# Patient Record
Sex: FEMALE | Race: WHITE | Employment: OTHER | URBAN - NONMETROPOLITAN AREA
[De-identification: names, ages, dates, MRNs, and addresses within clinical notes are randomized per-mention and may not be internally consistent; named-entity substitution may affect disease eponyms.]

---

## 2017-01-26 DIAGNOSIS — E28.2 PCOS (POLYCYSTIC OVARIAN SYNDROME): ICD-10-CM

## 2017-02-14 ENCOUNTER — HOSPITAL ENCOUNTER (OUTPATIENT)
Age: 37
Setting detail: SPECIMEN
Discharge: HOME | End: 2017-02-14
Payer: COMMERCIAL

## 2017-02-14 ENCOUNTER — OFFICE VISIT (OUTPATIENT)
Dept: PRIMARY CARE CLINIC | Age: 37
End: 2017-02-14
Payer: COMMERCIAL

## 2017-02-14 VITALS
OXYGEN SATURATION: 98 % | SYSTOLIC BLOOD PRESSURE: 110 MMHG | DIASTOLIC BLOOD PRESSURE: 80 MMHG | WEIGHT: 156.75 LBS | HEIGHT: 66 IN | HEART RATE: 99 BPM | BODY MASS INDEX: 25.19 KG/M2 | TEMPERATURE: 97.7 F

## 2017-02-14 DIAGNOSIS — F41.9 ANXIETY: ICD-10-CM

## 2017-02-14 DIAGNOSIS — F41.0 PANIC ATTACKS: ICD-10-CM

## 2017-02-14 DIAGNOSIS — R21 RASH: ICD-10-CM

## 2017-02-14 DIAGNOSIS — E28.2 PCOS (POLYCYSTIC OVARIAN SYNDROME): ICD-10-CM

## 2017-02-14 DIAGNOSIS — Z12.4 ROUTINE PAPANICOLAOU SMEAR: Primary | ICD-10-CM

## 2017-02-14 DIAGNOSIS — Z87.42 HX OF OVARIAN CYST: ICD-10-CM

## 2017-02-14 DIAGNOSIS — H53.9 VISUAL CHANGES: ICD-10-CM

## 2017-02-14 PROCEDURE — 99214 OFFICE O/P EST MOD 30 MIN: CPT | Performed by: NURSE PRACTITIONER

## 2017-02-14 PROCEDURE — 88142 CYTOPATH C/V THIN LAYER: CPT

## 2017-02-14 RX ORDER — CLOTRIMAZOLE 1 %
CREAM (GRAM) TOPICAL
Qty: 24 G | Refills: 1 | Status: SHIPPED | OUTPATIENT
Start: 2017-02-14 | End: 2017-03-31 | Stop reason: SDUPTHER

## 2017-02-14 RX ORDER — DIAZEPAM 10 MG/1
10 TABLET ORAL EVERY 12 HOURS PRN
Qty: 60 TABLET | Refills: 0 | Status: SHIPPED | OUTPATIENT
Start: 2017-02-14 | End: 2017-02-24

## 2017-02-14 RX ORDER — LORAZEPAM 1 MG/1
1 TABLET ORAL EVERY 8 HOURS PRN
Qty: 90 TABLET | Refills: 0 | Status: CANCELLED | OUTPATIENT
Start: 2017-02-14

## 2017-02-14 ASSESSMENT — ENCOUNTER SYMPTOMS
VOICE CHANGE: 1
CONSTIPATION: 0
RHINORRHEA: 0
SHORTNESS OF BREATH: 0
SORE THROAT: 0
EYE REDNESS: 0
COUGH: 0
DIARRHEA: 0
VOMITING: 0

## 2017-02-21 ENCOUNTER — TELEPHONE (OUTPATIENT)
Dept: PRIMARY CARE CLINIC | Age: 37
End: 2017-02-21

## 2017-02-27 ENCOUNTER — TELEPHONE (OUTPATIENT)
Dept: PRIMARY CARE CLINIC | Age: 37
End: 2017-02-27

## 2017-02-27 DIAGNOSIS — Z00.00 ROUTINE GENERAL MEDICAL EXAMINATION AT A HEALTH CARE FACILITY: Primary | ICD-10-CM

## 2017-02-28 ENCOUNTER — OFFICE VISIT (OUTPATIENT)
Dept: PRIMARY CARE CLINIC | Age: 37
End: 2017-02-28
Payer: COMMERCIAL

## 2017-02-28 VITALS
DIASTOLIC BLOOD PRESSURE: 70 MMHG | HEIGHT: 66 IN | HEART RATE: 80 BPM | OXYGEN SATURATION: 99 % | WEIGHT: 156.5 LBS | TEMPERATURE: 98.6 F | BODY MASS INDEX: 25.15 KG/M2 | SYSTOLIC BLOOD PRESSURE: 110 MMHG

## 2017-02-28 DIAGNOSIS — M54.42 CHRONIC BILATERAL LOW BACK PAIN WITH BILATERAL SCIATICA: Primary | ICD-10-CM

## 2017-02-28 DIAGNOSIS — G89.29 CHRONIC BILATERAL THORACIC BACK PAIN: ICD-10-CM

## 2017-02-28 DIAGNOSIS — M54.6 CHRONIC BILATERAL THORACIC BACK PAIN: ICD-10-CM

## 2017-02-28 DIAGNOSIS — G62.9 NEUROPATHY: ICD-10-CM

## 2017-02-28 DIAGNOSIS — M54.41 CHRONIC BILATERAL LOW BACK PAIN WITH BILATERAL SCIATICA: Primary | ICD-10-CM

## 2017-02-28 DIAGNOSIS — M54.2 CERVICAL PAIN (NECK): ICD-10-CM

## 2017-02-28 DIAGNOSIS — G89.29 CHRONIC BILATERAL LOW BACK PAIN WITH BILATERAL SCIATICA: Primary | ICD-10-CM

## 2017-02-28 DIAGNOSIS — Z13.1 DIABETES MELLITUS SCREENING: ICD-10-CM

## 2017-02-28 PROCEDURE — 99213 OFFICE O/P EST LOW 20 MIN: CPT | Performed by: NURSE PRACTITIONER

## 2017-02-28 RX ORDER — PREDNISONE 10 MG/1
10 TABLET ORAL DAILY
Qty: 42 TABLET | Refills: 0 | Status: SHIPPED | OUTPATIENT
Start: 2017-02-28 | End: 2017-03-10

## 2017-02-28 RX ORDER — DIAZEPAM 10 MG/1
10 TABLET ORAL EVERY 6 HOURS PRN
COMMUNITY
End: 2017-03-31 | Stop reason: SDUPTHER

## 2017-02-28 RX ORDER — HYDROCODONE BITARTRATE AND ACETAMINOPHEN 7.5; 325 MG/1; MG/1
1 TABLET ORAL EVERY 6 HOURS PRN
Qty: 30 TABLET | Refills: 0 | Status: SHIPPED | OUTPATIENT
Start: 2017-02-28 | End: 2017-03-07

## 2017-02-28 RX ORDER — TIZANIDINE 4 MG/1
4 TABLET ORAL 3 TIMES DAILY
Qty: 60 TABLET | Refills: 1 | Status: SHIPPED | OUTPATIENT
Start: 2017-02-28 | End: 2017-06-27

## 2017-02-28 ASSESSMENT — ENCOUNTER SYMPTOMS
EYE REDNESS: 0
VOICE CHANGE: 1
RHINORRHEA: 0
CONSTIPATION: 0
SORE THROAT: 0
SHORTNESS OF BREATH: 0
VOMITING: 0
DIARRHEA: 0
BACK PAIN: 1
COUGH: 0

## 2017-03-01 DIAGNOSIS — F41.9 ANXIETY: ICD-10-CM

## 2017-03-01 RX ORDER — DULOXETIN HYDROCHLORIDE 30 MG/1
30 CAPSULE, DELAYED RELEASE ORAL DAILY
Qty: 30 CAPSULE | Refills: 3 | Status: SHIPPED | OUTPATIENT
Start: 2017-03-01 | End: 2017-10-06 | Stop reason: SDUPTHER

## 2017-03-06 ENCOUNTER — TELEPHONE (OUTPATIENT)
Dept: PRIMARY CARE CLINIC | Age: 37
End: 2017-03-06

## 2017-03-06 DIAGNOSIS — M54.5 LOW BACK PAIN, UNSPECIFIED BACK PAIN LATERALITY, UNSPECIFIED CHRONICITY, WITH SCIATICA PRESENCE UNSPECIFIED: ICD-10-CM

## 2017-03-06 DIAGNOSIS — M54.6 THORACIC BACK PAIN, UNSPECIFIED BACK PAIN LATERALITY, UNSPECIFIED CHRONICITY: Primary | ICD-10-CM

## 2017-03-07 RX ORDER — MELOXICAM 15 MG/1
15 TABLET ORAL DAILY
Qty: 30 TABLET | Refills: 3 | Status: SHIPPED | OUTPATIENT
Start: 2017-03-07 | End: 2017-06-27

## 2017-03-29 ENCOUNTER — HOSPITAL ENCOUNTER (OUTPATIENT)
Dept: GENERAL RADIOLOGY | Age: 37
Discharge: HOME | End: 2017-03-29
Payer: COMMERCIAL

## 2017-03-29 DIAGNOSIS — Z87.42 HX OF OVARIAN CYST: ICD-10-CM

## 2017-03-29 PROCEDURE — 76856 US EXAM PELVIC COMPLETE: CPT

## 2017-03-30 ENCOUNTER — TELEPHONE (OUTPATIENT)
Dept: PRIMARY CARE CLINIC | Age: 37
End: 2017-03-30

## 2017-03-31 ENCOUNTER — OFFICE VISIT (OUTPATIENT)
Dept: PRIMARY CARE CLINIC | Age: 37
End: 2017-03-31
Payer: COMMERCIAL

## 2017-03-31 VITALS
HEIGHT: 66 IN | WEIGHT: 152.25 LBS | HEART RATE: 92 BPM | OXYGEN SATURATION: 98 % | BODY MASS INDEX: 24.47 KG/M2 | TEMPERATURE: 98.5 F | DIASTOLIC BLOOD PRESSURE: 80 MMHG | SYSTOLIC BLOOD PRESSURE: 130 MMHG

## 2017-03-31 DIAGNOSIS — F41.9 ANXIETY: Primary | ICD-10-CM

## 2017-03-31 DIAGNOSIS — J34.89 NASAL VESTIBULITIS: ICD-10-CM

## 2017-03-31 DIAGNOSIS — R21 RASH: ICD-10-CM

## 2017-03-31 DIAGNOSIS — R45.4 ANGER: ICD-10-CM

## 2017-03-31 DIAGNOSIS — G89.29 CHRONIC BILATERAL LOW BACK PAIN WITHOUT SCIATICA: ICD-10-CM

## 2017-03-31 DIAGNOSIS — M54.50 CHRONIC BILATERAL LOW BACK PAIN WITHOUT SCIATICA: ICD-10-CM

## 2017-03-31 DIAGNOSIS — Z00.00 ROUTINE GENERAL MEDICAL EXAMINATION AT A HEALTH CARE FACILITY: ICD-10-CM

## 2017-03-31 DIAGNOSIS — F90.2 ATTENTION DEFICIT HYPERACTIVITY DISORDER (ADHD), COMBINED TYPE: ICD-10-CM

## 2017-03-31 DIAGNOSIS — Z13.1 DIABETES MELLITUS SCREENING: ICD-10-CM

## 2017-03-31 LAB
ALBUMIN SERPL-MCNC: 4.5 G/DL (ref 3.5–5.2)
ALP BLD-CCNC: 46 U/L (ref 35–104)
ALT SERPL-CCNC: 19 U/L (ref 5–33)
ANION GAP SERPL CALCULATED.3IONS-SCNC: 17 MMOL/L (ref 7–19)
AST SERPL-CCNC: 25 U/L (ref 5–32)
BASOPHILS ABSOLUTE: 0 K/UL (ref 0–0.2)
BASOPHILS RELATIVE PERCENT: 0.2 % (ref 0–1)
BILIRUB SERPL-MCNC: 0.3 MG/DL (ref 0.2–1.2)
BUN BLDV-MCNC: 16 MG/DL (ref 6–20)
CALCIUM SERPL-MCNC: 9.6 MG/DL (ref 8.6–10)
CHLORIDE BLD-SCNC: 101 MMOL/L (ref 98–111)
CHOLESTEROL, TOTAL: 207 MG/DL (ref 160–199)
CO2: 25 MMOL/L (ref 22–29)
CREAT SERPL-MCNC: 0.8 MG/DL (ref 0.5–0.9)
EOSINOPHILS ABSOLUTE: 0.1 K/UL (ref 0–0.6)
EOSINOPHILS RELATIVE PERCENT: 1.3 % (ref 0–5)
GFR NON-AFRICAN AMERICAN: >60
GLOBULIN: 3.1 G/DL
GLUCOSE BLD-MCNC: 87 MG/DL (ref 74–109)
HBA1C MFR BLD: 5 %
HCT VFR BLD CALC: 39.8 % (ref 37–47)
HDLC SERPL-MCNC: 55 MG/DL (ref 65–121)
HEMOGLOBIN: 12.3 G/DL (ref 12–16)
LDL CHOLESTEROL CALCULATED: 125 MG/DL
LYMPHOCYTES ABSOLUTE: 1.3 K/UL (ref 1.1–4.5)
LYMPHOCYTES RELATIVE PERCENT: 25.1 % (ref 20–40)
MCH RBC QN AUTO: 28.7 PG (ref 27–31)
MCHC RBC AUTO-ENTMCNC: 30.9 G/DL (ref 33–37)
MCV RBC AUTO: 92.8 FL (ref 81–99)
MONOCYTES ABSOLUTE: 0.6 K/UL (ref 0–0.9)
MONOCYTES RELATIVE PERCENT: 11.3 % (ref 0–10)
NEUTROPHILS ABSOLUTE: 3.3 K/UL (ref 1.5–7.5)
NEUTROPHILS RELATIVE PERCENT: 62.1 % (ref 50–65)
PDW BLD-RTO: 13.3 % (ref 11.5–14.5)
PLATELET # BLD: 392 K/UL (ref 130–400)
PMV BLD AUTO: 10.4 FL (ref 7.4–10.4)
POTASSIUM SERPL-SCNC: 4.1 MMOL/L (ref 3.5–5)
RBC # BLD: 4.29 M/UL (ref 4.2–5.4)
SODIUM BLD-SCNC: 143 MMOL/L (ref 136–145)
T4 FREE: 1 NG/ML (ref 0.9–1.7)
TOTAL PROTEIN: 7.6 G/DL (ref 6.6–8.7)
TRIGL SERPL-MCNC: 135 MG/DL (ref 150–199)
TSH SERPL DL<=0.05 MIU/L-ACNC: 0.51 UIU/ML (ref 0.27–4.2)
WBC # BLD: 5.3 K/UL (ref 4.8–10.8)

## 2017-03-31 PROCEDURE — 99214 OFFICE O/P EST MOD 30 MIN: CPT | Performed by: NURSE PRACTITIONER

## 2017-03-31 RX ORDER — DEXTROAMPHETAMINE SACCHARATE, AMPHETAMINE ASPARTATE, DEXTROAMPHETAMINE SULFATE AND AMPHETAMINE SULFATE 2.5; 2.5; 2.5; 2.5 MG/1; MG/1; MG/1; MG/1
TABLET ORAL
COMMUNITY
Start: 2017-03-28

## 2017-03-31 RX ORDER — DIAZEPAM 10 MG/1
10 TABLET ORAL EVERY 8 HOURS PRN
Qty: 90 TABLET | Refills: 0 | Status: SHIPPED | OUTPATIENT
Start: 2017-03-31 | End: 2017-05-10 | Stop reason: SDUPTHER

## 2017-03-31 RX ORDER — DEXTROAMPHETAMINE SACCHARATE, AMPHETAMINE ASPARTATE MONOHYDRATE, DEXTROAMPHETAMINE SULFATE AND AMPHETAMINE SULFATE 7.5; 7.5; 7.5; 7.5 MG/1; MG/1; MG/1; MG/1
CAPSULE, EXTENDED RELEASE ORAL
COMMUNITY
Start: 2017-03-28

## 2017-03-31 RX ORDER — CLOTRIMAZOLE 1 %
CREAM (GRAM) TOPICAL
Qty: 60 G | Refills: 1 | Status: SHIPPED | OUTPATIENT
Start: 2017-03-31 | End: 2017-04-07

## 2017-03-31 ASSESSMENT — ENCOUNTER SYMPTOMS
CONSTIPATION: 0
BACK PAIN: 1
VOMITING: 0
RHINORRHEA: 0
EYE REDNESS: 0
DIARRHEA: 0
SHORTNESS OF BREATH: 0
VOICE CHANGE: 1
COUGH: 0
SORE THROAT: 0

## 2017-04-04 ENCOUNTER — TELEPHONE (OUTPATIENT)
Dept: PRIMARY CARE CLINIC | Age: 37
End: 2017-04-04

## 2017-04-04 DIAGNOSIS — Z79.899 MEDICATION MANAGEMENT: ICD-10-CM

## 2017-04-04 DIAGNOSIS — E78.2 MIXED HYPERLIPIDEMIA: Primary | ICD-10-CM

## 2017-04-04 RX ORDER — SIMVASTATIN 20 MG
20 TABLET ORAL EVERY EVENING
Qty: 30 TABLET | Refills: 11 | Status: SHIPPED | OUTPATIENT
Start: 2017-04-04 | End: 2018-04-08 | Stop reason: SDUPTHER

## 2017-04-10 RX ORDER — GABAPENTIN 400 MG/1
400 CAPSULE ORAL 3 TIMES DAILY
Qty: 90 CAPSULE | Refills: 1 | Status: SHIPPED | OUTPATIENT
Start: 2017-04-10 | End: 2017-07-18 | Stop reason: SDUPTHER

## 2017-05-08 ENCOUNTER — TELEPHONE (OUTPATIENT)
Dept: PRIMARY CARE CLINIC | Age: 37
End: 2017-05-08

## 2017-05-08 DIAGNOSIS — F41.9 ANXIETY: ICD-10-CM

## 2017-05-08 RX ORDER — DIAZEPAM 10 MG/1
10 TABLET ORAL EVERY 8 HOURS PRN
Qty: 90 TABLET | Refills: 0 | Status: CANCELLED | OUTPATIENT
Start: 2017-05-08

## 2017-05-10 ENCOUNTER — OFFICE VISIT (OUTPATIENT)
Dept: PRIMARY CARE CLINIC | Age: 37
End: 2017-05-10
Payer: COMMERCIAL

## 2017-05-10 VITALS
SYSTOLIC BLOOD PRESSURE: 120 MMHG | HEART RATE: 77 BPM | TEMPERATURE: 98.2 F | BODY MASS INDEX: 25.59 KG/M2 | OXYGEN SATURATION: 98 % | DIASTOLIC BLOOD PRESSURE: 80 MMHG | HEIGHT: 66 IN | WEIGHT: 159.25 LBS

## 2017-05-10 DIAGNOSIS — G89.29 CHRONIC BILATERAL LOW BACK PAIN WITHOUT SCIATICA: ICD-10-CM

## 2017-05-10 DIAGNOSIS — M54.50 CHRONIC BILATERAL LOW BACK PAIN WITHOUT SCIATICA: ICD-10-CM

## 2017-05-10 DIAGNOSIS — F41.9 ANXIETY: Primary | ICD-10-CM

## 2017-05-10 PROCEDURE — 99213 OFFICE O/P EST LOW 20 MIN: CPT | Performed by: NURSE PRACTITIONER

## 2017-05-10 RX ORDER — DIAZEPAM 10 MG/1
10 TABLET ORAL EVERY 8 HOURS PRN
Qty: 90 TABLET | Refills: 0 | Status: SHIPPED | OUTPATIENT
Start: 2017-05-10 | End: 2017-07-26 | Stop reason: ALTCHOICE

## 2017-05-10 ASSESSMENT — ENCOUNTER SYMPTOMS
CONSTIPATION: 0
SORE THROAT: 0
COUGH: 0
BACK PAIN: 1
RHINORRHEA: 0
DIARRHEA: 0
EYE REDNESS: 0
VOMITING: 0
VOICE CHANGE: 1
SHORTNESS OF BREATH: 0

## 2017-05-15 ENCOUNTER — TELEPHONE (OUTPATIENT)
Dept: PRIMARY CARE CLINIC | Age: 37
End: 2017-05-15

## 2017-05-15 DIAGNOSIS — G89.29 CHRONIC BILATERAL LOW BACK PAIN WITHOUT SCIATICA: Primary | ICD-10-CM

## 2017-05-15 DIAGNOSIS — M54.50 CHRONIC BILATERAL LOW BACK PAIN WITHOUT SCIATICA: Primary | ICD-10-CM

## 2017-05-22 ENCOUNTER — TELEPHONE (OUTPATIENT)
Dept: PRIMARY CARE CLINIC | Age: 37
End: 2017-05-22

## 2017-05-22 ENCOUNTER — HOSPITAL ENCOUNTER (OUTPATIENT)
Dept: MRI IMAGING | Age: 37
Discharge: HOME | End: 2017-05-22
Payer: COMMERCIAL

## 2017-05-22 DIAGNOSIS — M54.50 CHRONIC BILATERAL LOW BACK PAIN WITHOUT SCIATICA: ICD-10-CM

## 2017-05-22 DIAGNOSIS — G89.29 CHRONIC BILATERAL LOW BACK PAIN WITHOUT SCIATICA: ICD-10-CM

## 2017-05-22 PROCEDURE — 72148 MRI LUMBAR SPINE W/O DYE: CPT

## 2017-05-24 ENCOUNTER — TELEPHONE (OUTPATIENT)
Dept: PRIMARY CARE CLINIC | Age: 37
End: 2017-05-24

## 2017-05-24 DIAGNOSIS — G89.29 CHRONIC BILATERAL LOW BACK PAIN, WITH SCIATICA PRESENCE UNSPECIFIED: ICD-10-CM

## 2017-05-24 DIAGNOSIS — M54.6 THORACIC BACK PAIN, UNSPECIFIED BACK PAIN LATERALITY, UNSPECIFIED CHRONICITY: Primary | ICD-10-CM

## 2017-05-24 DIAGNOSIS — M54.5 CHRONIC BILATERAL LOW BACK PAIN, WITH SCIATICA PRESENCE UNSPECIFIED: ICD-10-CM

## 2017-06-27 ENCOUNTER — OFFICE VISIT (OUTPATIENT)
Dept: PRIMARY CARE CLINIC | Age: 37
End: 2017-06-27
Payer: COMMERCIAL

## 2017-06-27 VITALS
SYSTOLIC BLOOD PRESSURE: 98 MMHG | DIASTOLIC BLOOD PRESSURE: 68 MMHG | HEIGHT: 65 IN | BODY MASS INDEX: 26.82 KG/M2 | HEART RATE: 79 BPM | OXYGEN SATURATION: 99 % | TEMPERATURE: 97.4 F | WEIGHT: 161 LBS

## 2017-06-27 DIAGNOSIS — F41.0 PANIC ATTACK: ICD-10-CM

## 2017-06-27 DIAGNOSIS — F90.2 ATTENTION DEFICIT HYPERACTIVITY DISORDER (ADHD), COMBINED TYPE: ICD-10-CM

## 2017-06-27 DIAGNOSIS — F41.9 ANXIETY: Primary | ICD-10-CM

## 2017-06-27 DIAGNOSIS — E28.2 PCOS (POLYCYSTIC OVARIAN SYNDROME): ICD-10-CM

## 2017-06-27 DIAGNOSIS — L65.9 HAIR LOSS: ICD-10-CM

## 2017-06-27 PROCEDURE — 99213 OFFICE O/P EST LOW 20 MIN: CPT | Performed by: NURSE PRACTITIONER

## 2017-06-27 RX ORDER — CLONAZEPAM 2 MG/1
2 TABLET ORAL 2 TIMES DAILY PRN
Qty: 60 TABLET | Refills: 0 | Status: SHIPPED | OUTPATIENT
Start: 2017-06-27 | End: 2017-08-11 | Stop reason: SDUPTHER

## 2017-06-27 RX ORDER — CLONAZEPAM 2 MG/1
2 TABLET ORAL 2 TIMES DAILY PRN
Qty: 60 TABLET | Refills: 3 | Status: SHIPPED | OUTPATIENT
Start: 2017-06-27 | End: 2017-06-27 | Stop reason: SDUPTHER

## 2017-06-27 RX ORDER — DIAZEPAM 10 MG/1
10 TABLET ORAL EVERY 8 HOURS PRN
Qty: 90 TABLET | Refills: 0 | Status: CANCELLED | OUTPATIENT
Start: 2017-06-27

## 2017-06-27 ASSESSMENT — ENCOUNTER SYMPTOMS
RHINORRHEA: 0
VOICE CHANGE: 1
VOMITING: 0
DIARRHEA: 0
COUGH: 0
SORE THROAT: 0
EYE REDNESS: 0
CONSTIPATION: 0
BACK PAIN: 1
SHORTNESS OF BREATH: 0

## 2017-07-13 DIAGNOSIS — F41.9 ANXIETY: ICD-10-CM

## 2017-07-13 RX ORDER — DULOXETIN HYDROCHLORIDE 30 MG/1
CAPSULE, DELAYED RELEASE ORAL
Qty: 30 CAPSULE | Refills: 11 | Status: SHIPPED | OUTPATIENT
Start: 2017-07-13 | End: 2017-08-11 | Stop reason: SDUPTHER

## 2017-07-19 RX ORDER — GABAPENTIN 400 MG/1
CAPSULE ORAL
Qty: 90 CAPSULE | Refills: 5 | Status: SHIPPED | OUTPATIENT
Start: 2017-07-19 | End: 2018-02-03 | Stop reason: SDUPTHER

## 2017-07-26 ENCOUNTER — HOSPITAL ENCOUNTER (OUTPATIENT)
Dept: PAIN MANAGEMENT | Age: 37
Discharge: HOME | End: 2017-07-26
Payer: COMMERCIAL

## 2017-07-26 VITALS
OXYGEN SATURATION: 93 % | HEIGHT: 66 IN | SYSTOLIC BLOOD PRESSURE: 114 MMHG | TEMPERATURE: 99.2 F | BODY MASS INDEX: 26.2 KG/M2 | WEIGHT: 163 LBS | HEART RATE: 131 BPM | RESPIRATION RATE: 20 BRPM | DIASTOLIC BLOOD PRESSURE: 73 MMHG

## 2017-07-26 DIAGNOSIS — M54.2 CERVICALGIA: ICD-10-CM

## 2017-07-26 DIAGNOSIS — M53.3 SACROILIAC JOINT DYSFUNCTION OF LEFT SIDE: ICD-10-CM

## 2017-07-26 DIAGNOSIS — M62.830 MUSCLE SPASM OF BACK: ICD-10-CM

## 2017-07-26 DIAGNOSIS — M54.9 MID BACK PAIN: ICD-10-CM

## 2017-07-26 DIAGNOSIS — M79.2 NEUROPATHIC PAIN: ICD-10-CM

## 2017-07-26 DIAGNOSIS — M54.12 CERVICAL RADICULOPATHY: Primary | ICD-10-CM

## 2017-07-26 PROCEDURE — 99204 OFFICE O/P NEW MOD 45 MIN: CPT

## 2017-07-26 PROCEDURE — 99215 OFFICE O/P EST HI 40 MIN: CPT | Performed by: NURSE PRACTITIONER

## 2017-07-26 RX ORDER — TRAMADOL HYDROCHLORIDE 50 MG/1
50 TABLET ORAL EVERY 8 HOURS PRN
Qty: 90 TABLET | Refills: 0 | Status: SHIPPED | OUTPATIENT
Start: 2017-07-26 | End: 2018-02-13

## 2017-07-26 RX ORDER — TIZANIDINE 4 MG/1
4 TABLET ORAL EVERY 8 HOURS PRN
Qty: 90 TABLET | Refills: 0 | Status: SHIPPED | OUTPATIENT
Start: 2017-07-26 | End: 2017-12-12

## 2017-08-11 ENCOUNTER — OFFICE VISIT (OUTPATIENT)
Dept: PRIMARY CARE CLINIC | Age: 37
End: 2017-08-11
Payer: COMMERCIAL

## 2017-08-11 VITALS
WEIGHT: 158.56 LBS | DIASTOLIC BLOOD PRESSURE: 70 MMHG | HEART RATE: 81 BPM | TEMPERATURE: 98.1 F | OXYGEN SATURATION: 98 % | SYSTOLIC BLOOD PRESSURE: 100 MMHG | BODY MASS INDEX: 25.48 KG/M2 | HEIGHT: 66 IN

## 2017-08-11 DIAGNOSIS — F41.0 PANIC ATTACK: ICD-10-CM

## 2017-08-11 DIAGNOSIS — E28.2 PCOS (POLYCYSTIC OVARIAN SYNDROME): ICD-10-CM

## 2017-08-11 DIAGNOSIS — F41.9 ANXIETY: Primary | ICD-10-CM

## 2017-08-11 PROCEDURE — 99213 OFFICE O/P EST LOW 20 MIN: CPT | Performed by: NURSE PRACTITIONER

## 2017-08-11 RX ORDER — CLONAZEPAM 2 MG/1
2 TABLET ORAL 2 TIMES DAILY PRN
Qty: 60 TABLET | Refills: 0 | Status: SHIPPED | OUTPATIENT
Start: 2017-08-11 | End: 2017-09-11 | Stop reason: SDUPTHER

## 2017-08-11 ASSESSMENT — ENCOUNTER SYMPTOMS
VOICE CHANGE: 1
VOMITING: 0
BACK PAIN: 1
SHORTNESS OF BREATH: 0
RHINORRHEA: 0
SORE THROAT: 0
CONSTIPATION: 0
EYE REDNESS: 0
DIARRHEA: 0
COUGH: 0

## 2017-08-14 ENCOUNTER — HOSPITAL ENCOUNTER (OUTPATIENT)
Dept: MRI IMAGING | Age: 37
Discharge: HOME | End: 2017-08-14
Payer: COMMERCIAL

## 2017-08-14 DIAGNOSIS — M54.12 CERVICAL RADICULOPATHY: ICD-10-CM

## 2017-08-14 DIAGNOSIS — M54.2 CERVICALGIA: ICD-10-CM

## 2017-08-14 DIAGNOSIS — M62.830 MUSCLE SPASM OF BACK: ICD-10-CM

## 2017-08-14 DIAGNOSIS — M54.9 MID BACK PAIN: ICD-10-CM

## 2017-08-14 PROCEDURE — 72141 MRI NECK SPINE W/O DYE: CPT

## 2017-08-14 PROCEDURE — 72146 MRI CHEST SPINE W/O DYE: CPT

## 2017-09-11 ENCOUNTER — OFFICE VISIT (OUTPATIENT)
Dept: PRIMARY CARE CLINIC | Age: 37
End: 2017-09-11
Payer: COMMERCIAL

## 2017-09-11 VITALS
SYSTOLIC BLOOD PRESSURE: 118 MMHG | DIASTOLIC BLOOD PRESSURE: 70 MMHG | WEIGHT: 158 LBS | HEIGHT: 66 IN | OXYGEN SATURATION: 99 % | HEART RATE: 84 BPM | TEMPERATURE: 97.9 F | BODY MASS INDEX: 25.39 KG/M2

## 2017-09-11 DIAGNOSIS — F41.0 PANIC ATTACK: ICD-10-CM

## 2017-09-11 DIAGNOSIS — F41.9 ANXIETY: ICD-10-CM

## 2017-09-11 PROCEDURE — 99213 OFFICE O/P EST LOW 20 MIN: CPT | Performed by: NURSE PRACTITIONER

## 2017-09-11 RX ORDER — CLONAZEPAM 2 MG/1
2 TABLET ORAL 2 TIMES DAILY PRN
Qty: 60 TABLET | Refills: 0 | Status: SHIPPED | OUTPATIENT
Start: 2017-09-11 | End: 2017-10-06 | Stop reason: SDUPTHER

## 2017-09-11 ASSESSMENT — ENCOUNTER SYMPTOMS
VOMITING: 0
SORE THROAT: 0
CONSTIPATION: 0
BACK PAIN: 1
SHORTNESS OF BREATH: 0
VOICE CHANGE: 1
EYE REDNESS: 0
DIARRHEA: 0
COUGH: 0
RHINORRHEA: 0

## 2017-10-06 ENCOUNTER — OFFICE VISIT (OUTPATIENT)
Dept: PRIMARY CARE CLINIC | Age: 37
End: 2017-10-06
Payer: COMMERCIAL

## 2017-10-06 VITALS
OXYGEN SATURATION: 98 % | BODY MASS INDEX: 26.16 KG/M2 | TEMPERATURE: 98 F | SYSTOLIC BLOOD PRESSURE: 126 MMHG | DIASTOLIC BLOOD PRESSURE: 80 MMHG | WEIGHT: 162.75 LBS | HEIGHT: 66 IN | HEART RATE: 85 BPM

## 2017-10-06 DIAGNOSIS — F41.0 PANIC ATTACK: ICD-10-CM

## 2017-10-06 DIAGNOSIS — F41.9 ANXIETY: Primary | ICD-10-CM

## 2017-10-06 DIAGNOSIS — Z79.899 MEDICATION MANAGEMENT: ICD-10-CM

## 2017-10-06 LAB
AMPHETAMINE SCREEN, URINE: NORMAL
BARBITURATE SCREEN, URINE: NORMAL
BENZODIAZEPINE SCREEN, URINE: NORMAL
COCAINE METABOLITE SCREEN URINE: NORMAL
MDMA URINE: NORMAL
METHADONE SCREEN, URINE: NORMAL
METHAMPHETAMINE, URINE: NORMAL
OPIATE SCREEN URINE: NORMAL
OXYCODONE SCREEN URINE: NORMAL
PHENCYCLIDINE SCREEN URINE: NORMAL
PROPOXYPHENE SCREEN, URINE: NORMAL
THC: NORMAL
TRICYCLIC ANTIDEPRESSANTS, UR: NORMAL

## 2017-10-06 PROCEDURE — 80305 DRUG TEST PRSMV DIR OPT OBS: CPT | Performed by: NURSE PRACTITIONER

## 2017-10-06 PROCEDURE — 99213 OFFICE O/P EST LOW 20 MIN: CPT | Performed by: NURSE PRACTITIONER

## 2017-10-06 RX ORDER — DULOXETIN HYDROCHLORIDE 60 MG/1
60 CAPSULE, DELAYED RELEASE ORAL DAILY
Qty: 30 CAPSULE | Refills: 5 | Status: SHIPPED | OUTPATIENT
Start: 2017-10-06 | End: 2018-08-10 | Stop reason: DRUGHIGH

## 2017-10-06 RX ORDER — CLONAZEPAM 2 MG/1
2 TABLET ORAL 2 TIMES DAILY PRN
Qty: 60 TABLET | Refills: 0 | Status: SHIPPED | OUTPATIENT
Start: 2017-10-06 | End: 2017-11-10 | Stop reason: SDUPTHER

## 2017-10-06 ASSESSMENT — ENCOUNTER SYMPTOMS
RHINORRHEA: 0
SHORTNESS OF BREATH: 0
COUGH: 0
DIARRHEA: 0
SORE THROAT: 0
VOICE CHANGE: 0
CONSTIPATION: 0
VOMITING: 0
BACK PAIN: 1
EYE REDNESS: 0

## 2017-10-06 NOTE — PROGRESS NOTES
 Eosinophils % 03/31/2017 1.3     Basophils % 03/31/2017 0.2     Neutrophils # 03/31/2017 3.3     Lymphocytes # 03/31/2017 1.3     Monocytes # 03/31/2017 0.60     Eosinophils # 03/31/2017 0.10     Basophils # 03/31/2017 0.00     Sodium 03/31/2017 143     Potassium 03/31/2017 4.1     Chloride 03/31/2017 101     CO2 03/31/2017 25     Anion Gap 03/31/2017 17     Glucose 03/31/2017 87     BUN 03/31/2017 16     CREATININE 03/31/2017 0.8     GFR Non- 03/31/2017 >60     Calcium 03/31/2017 9.6     Total Protein 03/31/2017 7.6     Alb 03/31/2017 4.5     Total Bilirubin 03/31/2017 0.3     Alkaline Phosphatase 03/31/2017 46     ALT 03/31/2017 19     AST 03/31/2017 25     Globulin 03/31/2017 3.1     TSH 03/31/2017 0.51     T4 Free 03/31/2017 1.0     Cholesterol, Total 03/31/2017 207*    Triglycerides 03/31/2017 135*    HDL 03/31/2017 55*    LDL Calculated 03/31/2017 125      Copies of these are in the chart. Prior to Visit Medications    Medication Sig Taking?  Authorizing Provider   Prenatal MV-Min-Fe Fum-FA-DHA (PRENATAL 1 PO) Take by mouth Yes Historical Provider, MD   clonazePAM (KLONOPIN) 2 MG tablet Take 1 tablet by mouth 2 times daily as needed (anxiety) Yes RYAN Peters   DULoxetine (CYMBALTA) 60 MG extended release capsule Take 1 capsule by mouth daily Yes RYAN Peters   metFORMIN (GLUCOPHAGE) 1000 MG tablet Take 1 tablet by mouth 2 times daily (with meals) Yes RYAN Peters   tiZANidine (ZANAFLEX) 4 MG tablet Take 1 tablet by mouth every 8 hours as needed (muscle spasm) Yes Ugo CrystalRYAN   traMADol (ULTRAM) 50 MG tablet Take 1 tablet by mouth every 8 hours as needed for Pain Yes Alric Crystal APRN   gabapentin (NEURONTIN) 400 MG capsule TAKE 1 CAPSULE BY MOUTH 3 TIMES DAILY Yes RYAN Peters   simvastatin (ZOCOR) 20 MG tablet Take 1 tablet by mouth every evening Yes RYAN Morin   amphetamine-dextroamphetamine (ADDERALL) 10 MG Normal rate and regular rhythm. Pulmonary/Chest: Effort normal and breath sounds normal. No respiratory distress. She has no wheezes. She has no rales. Abdominal: Soft. Bowel sounds are normal.   Musculoskeletal:        Cervical back: She exhibits tenderness. Thoracic back: She exhibits tenderness. Lumbar back: She exhibits tenderness. Neurological: She is alert. Skin: Skin is dry. Psychiatric: Her mood appears anxious. Vitals reviewed. ASSESSMENT      ICD-10-CM ICD-9-CM    1. Anxiety F41.9 300.00 clonazePAM (KLONOPIN) 2 MG tablet #60      POCT Rapid Drug Screen      DULoxetine (CYMBALTA) 60 MG extended release capsule  (increased from 30 mg)   2. Panic attack F41.0 300.01 clonazePAM (KLONOPIN) 2 MG tablet      POCT Rapid Drug Screen   3. Medication management Z79.899 V58.69 Drug screen in negative for ALL medications  Will need to send urine for confirmation  Went ahead and filled Klonopin this month will re-evaluate once I receive confirmation         PLAN    Orders Placed This Encounter   Procedures    POCT Rapid Drug Screen        Return if symptoms worsen or fail to improve. There are no Patient Instructions on file for this visit. Controlled Substances Monitoring: Attestation: The Prescription Monitoring Report for this patient was reviewed today. [facility], APRN)  Documentation: Possible medication side effects, risk of tolerance and/or dependence, and alternative treatments discussed. ([id number]) [facility], APRN)            Additional Instructions: As always, patient is advised to bring in medication bottles in order to correctly reconcile with our current list.    Zachary Macdonald received counseling on the following healthy behaviors: n/a    Patient given educational materials on dx    I have instructed Vickie to complete a self tracking handout on n/a and instructed them to bring it with them to her next appointment.      Discussed use, benefit, and side effects of

## 2017-11-10 ENCOUNTER — OFFICE VISIT (OUTPATIENT)
Dept: PRIMARY CARE CLINIC | Age: 37
End: 2017-11-10
Payer: COMMERCIAL

## 2017-11-10 VITALS
WEIGHT: 164 LBS | OXYGEN SATURATION: 98 % | HEIGHT: 66 IN | DIASTOLIC BLOOD PRESSURE: 80 MMHG | TEMPERATURE: 97.6 F | SYSTOLIC BLOOD PRESSURE: 130 MMHG | BODY MASS INDEX: 26.36 KG/M2 | HEART RATE: 71 BPM

## 2017-11-10 DIAGNOSIS — F41.0 PANIC ATTACK: Primary | ICD-10-CM

## 2017-11-10 DIAGNOSIS — J02.9 SORE THROAT: ICD-10-CM

## 2017-11-10 DIAGNOSIS — F41.9 ANXIETY: ICD-10-CM

## 2017-11-10 DIAGNOSIS — F90.2 ATTENTION DEFICIT HYPERACTIVITY DISORDER (ADHD), COMBINED TYPE: ICD-10-CM

## 2017-11-10 DIAGNOSIS — J02.9 PHARYNGITIS, UNSPECIFIED ETIOLOGY: ICD-10-CM

## 2017-11-10 PROCEDURE — 1036F TOBACCO NON-USER: CPT | Performed by: NURSE PRACTITIONER

## 2017-11-10 PROCEDURE — G8417 CALC BMI ABV UP PARAM F/U: HCPCS | Performed by: NURSE PRACTITIONER

## 2017-11-10 PROCEDURE — G8484 FLU IMMUNIZE NO ADMIN: HCPCS | Performed by: NURSE PRACTITIONER

## 2017-11-10 PROCEDURE — 99213 OFFICE O/P EST LOW 20 MIN: CPT | Performed by: NURSE PRACTITIONER

## 2017-11-10 PROCEDURE — 87880 STREP A ASSAY W/OPTIC: CPT | Performed by: NURSE PRACTITIONER

## 2017-11-10 PROCEDURE — G8427 DOCREV CUR MEDS BY ELIG CLIN: HCPCS | Performed by: NURSE PRACTITIONER

## 2017-11-10 RX ORDER — AZITHROMYCIN 250 MG/1
TABLET, FILM COATED ORAL
Qty: 6 TABLET | Refills: 0 | Status: SHIPPED | OUTPATIENT
Start: 2017-11-10 | End: 2017-11-15

## 2017-11-10 RX ORDER — CLONAZEPAM 2 MG/1
2 TABLET ORAL 2 TIMES DAILY PRN
Qty: 60 TABLET | Refills: 0 | Status: SHIPPED | OUTPATIENT
Start: 2017-11-10 | End: 2017-12-12 | Stop reason: SDUPTHER

## 2017-11-10 ASSESSMENT — ENCOUNTER SYMPTOMS
DIARRHEA: 0
CONSTIPATION: 0
BACK PAIN: 1
EYE REDNESS: 0
VOICE CHANGE: 0
SHORTNESS OF BREATH: 0
SORE THROAT: 1
COUGH: 0
RHINORRHEA: 1
VOMITING: 0

## 2017-11-10 NOTE — PROGRESS NOTES
tobacco: Never Used    Alcohol use 0.0 oz/week       Review of Systems   Constitutional: Negative for chills, fatigue and fever. HENT: Positive for congestion, ear pain, rhinorrhea and sore throat. Negative for voice change. Eyes: Negative for redness. Respiratory: Negative for cough and shortness of breath. Cardiovascular: Negative for chest pain. Gastrointestinal: Negative for constipation, diarrhea and vomiting. Musculoskeletal: Positive for arthralgias, back pain and neck pain. Skin: Negative for rash. Neurological: Negative for dizziness and headaches. Psychiatric/Behavioral: Positive for decreased concentration (ADHD managed by Dr. Giulia Bee) and sleep disturbance (improved). The patient is nervous/anxious (worse recently since she lost her dog). Panic attacks--improved with Klonopin         Physical Exam   Constitutional: She appears well-developed. HENT:   Head: Normocephalic. Right Ear: Hearing and external ear normal.   Left Ear: Hearing and external ear normal.   Nose: Nose normal.   Mouth/Throat: Posterior oropharyngeal erythema (2+ bilaterally) present. Neck: Normal range of motion. Carotid bruit is not present. Cardiovascular: Normal rate and regular rhythm. Pulmonary/Chest: Effort normal and breath sounds normal. No respiratory distress. She has no wheezes. She has no rales. Abdominal: Soft. Bowel sounds are normal.   Musculoskeletal:        Cervical back: She exhibits tenderness. Thoracic back: She exhibits tenderness. Lumbar back: She exhibits tenderness. Neurological: She is alert. Skin: Skin is dry. Psychiatric: Her mood appears anxious. Vitals reviewed. ASSESSMENT      ICD-10-CM ICD-9-CM    1. Panic attack F41.0 300.01 clonazePAM (KLONOPIN) 2 MG tablet   2. Anxiety F41.9 300.00 clonazePAM (KLONOPIN) 2 MG tablet   3. Sore throat J02.9 462 POCT rapid strep A   4.  Attention deficit hyperactivity disorder (ADHD), combined type F90.2 314.01 Continue to follow with Dr. Manish Apodaca   5. Pharyngitis, unspecified etiology J02.9 462 Zpak prescription given to the patient to fill only if symptoms worsen         PLAN    Orders Placed This Encounter   Procedures    POCT rapid strep A      Strep negative    Return in about 1 month (around 12/10/2017), or if symptoms worsen or fail to improve. Patient Instructions     Patient Education          clonazepam  Pronunciation:  Celestino medina sheron  Brand:  Lokesh Velazquez  What is the most important information I should know about clonazepam?  You should not use this medicine if you have narrow-angle glaucoma or severe liver disease, or if you are allergic to Valium or a similar medicine. Call your doctor if you have any new or worsening symptoms of depression, unusual changes in behavior, or thoughts about suicide or hurting yourself. Clonazepam may be habit-forming. Never share clonazepam with another person. Keep the medication in a place where others cannot get to it. Selling or giving away this medicine is against the law. What is clonazepam?  Clonazepam is a benzodiazepine (omayra-alpa-dye-AZE-eh-chitra). Clonazepam affects chemicals in the brain that may be unbalanced. Clonazepam is also a seizure medicine, also called an anti-epileptic drug. Clonazepam is used to treat certain seizure disorders (including absence seizures or Lennox-Gastaut syndrome) in adults and children. Clonazepam is also used to treat panic disorder (including agoraphobia) in adults. Clonazepam may also be used for purposes not listed in this medication guide. What should I discuss with my healthcare provider before taking clonazepam?  You should not take clonazepam if you have:  · narrow-angle glaucoma;  · severe liver disease; or  · a history of allergic reaction to any benzodiazepine, such as diazepam (Valium), alprazolam (Xanax), lorazepam (Ativan), chlordiazepoxide, flurazepam, and others.   To make sure clonazepam is safe for larger or smaller amounts or for longer than recommended. Clonazepam may be habit-forming. Never share this medicine with another person, especially someone with a history of drug abuse or addiction. Keep the medication in a place where others cannot get to it. Misuse of habit-forming medicine can cause addiction, overdose, or death. Selling or giving away this medicine is against the law. Clonazepam should be used for only a short time. Do not take this medication for longer than 9 weeks without your doctor's advice. Swallow the regular clonazepam tablet whole, with a full glass of water. To take the orally disintegrating tablet:  · Keep the tablet in its blister pack until you are ready to take it. Open the package and peel back the foil. Do not push a tablet through the foil or you may damage the tablet. · Use dry hands to remove the tablet and place it in your mouth. · Do not swallow the tablet whole. Allow it to dissolve in your mouth without chewing. · Swallow several times as the tablet dissolves. If desired, you may drink liquid to help swallow the dissolved tablet. If you use this medicine long-term, you may need frequent medical tests. Do not stop using clonazepam suddenly or you could have unpleasant withdrawal symptoms, including a seizure (convulsions). Ask your doctor how to safely stop using this medicine. Call your doctor if this medicine seems to stop working as well in treating your seizures or anxiety symptoms. Seizures are often treated with a combination of drugs. Use all medications as directed by your doctor. Read the medication guide or patient instructions provided with each medication. Do not change your doses or medication schedule without your doctor's advice. Store at room temperature away from moisture, heat, and light. Keep track of the amount of medicine used from each new bottle.  Clonazepam is a drug of abuse and you should be aware if anyone is using your medicine

## 2017-11-10 NOTE — PATIENT INSTRUCTIONS
[id number] [facility] HospitalFotoSwipe Inc. Version: 6.04. Revision date: 9/28/2016. Care instructions adapted under license by [facility] ([facility]). If you have questions about a medical condition or this instruction, always ask your healthcare professional. [address] any warranty or liability for your use of this information.

## 2017-12-12 ENCOUNTER — OFFICE VISIT (OUTPATIENT)
Dept: PRIMARY CARE CLINIC | Age: 37
End: 2017-12-12
Payer: COMMERCIAL

## 2017-12-12 VITALS
OXYGEN SATURATION: 99 % | DIASTOLIC BLOOD PRESSURE: 70 MMHG | SYSTOLIC BLOOD PRESSURE: 100 MMHG | HEART RATE: 74 BPM | BODY MASS INDEX: 26.72 KG/M2 | TEMPERATURE: 97.7 F | HEIGHT: 66 IN | WEIGHT: 166.25 LBS

## 2017-12-12 DIAGNOSIS — F41.9 ANXIETY: Primary | ICD-10-CM

## 2017-12-12 DIAGNOSIS — F41.0 PANIC ATTACK: ICD-10-CM

## 2017-12-12 DIAGNOSIS — J00 ACUTE NASOPHARYNGITIS: ICD-10-CM

## 2017-12-12 PROCEDURE — G8417 CALC BMI ABV UP PARAM F/U: HCPCS | Performed by: NURSE PRACTITIONER

## 2017-12-12 PROCEDURE — 1036F TOBACCO NON-USER: CPT | Performed by: NURSE PRACTITIONER

## 2017-12-12 PROCEDURE — 99213 OFFICE O/P EST LOW 20 MIN: CPT | Performed by: NURSE PRACTITIONER

## 2017-12-12 PROCEDURE — G8484 FLU IMMUNIZE NO ADMIN: HCPCS | Performed by: NURSE PRACTITIONER

## 2017-12-12 PROCEDURE — G8427 DOCREV CUR MEDS BY ELIG CLIN: HCPCS | Performed by: NURSE PRACTITIONER

## 2017-12-12 RX ORDER — CLONAZEPAM 2 MG/1
2 TABLET ORAL 2 TIMES DAILY PRN
Qty: 60 TABLET | Refills: 0 | Status: SHIPPED | OUTPATIENT
Start: 2017-12-12 | End: 2018-01-10 | Stop reason: SDUPTHER

## 2017-12-12 RX ORDER — LORATADINE 10 MG/1
10 TABLET ORAL DAILY
Qty: 30 TABLET | Refills: 5 | Status: SHIPPED | OUTPATIENT
Start: 2017-12-12 | End: 2018-03-13

## 2017-12-12 ASSESSMENT — ENCOUNTER SYMPTOMS
COUGH: 1
SHORTNESS OF BREATH: 0
RHINORRHEA: 1
CONSTIPATION: 0
BACK PAIN: 1
EYE REDNESS: 0
SORE THROAT: 0
DIARRHEA: 0
VOMITING: 0
VOICE CHANGE: 0

## 2017-12-12 NOTE — PATIENT INSTRUCTIONS
mucus from your lungs by breathing deeply and coughing. · Gargle with warm salt water once an hour. This can help reduce swelling and throat pain. Use 1 teaspoon of salt mixed in 1 cup of warm water. · Do not smoke or allow others to smoke around you. If you need help quitting, talk to your doctor about stop-smoking programs and medicines. These can increase your chances of quitting for good. To avoid spreading the virus  · Cough or sneeze into a tissue. Then throw the tissue away. · If you don't have a tissue, use your hand to cover your cough or sneeze. Then clean your hand. You can also cough into your sleeve. · Wash your hands often. Use soap and warm water. Wash for 15 to 20 seconds each time. · If you don't have soap and water near you, you can clean your hands with alcohol wipes or gel. When should you call for help? Call your doctor now or seek immediate medical care if:  ? · You have a new or higher fever. ? · Your fever lasts more than 48 hours. ? · You have trouble breathing. ? · You have a fever with a stiff neck or a severe headache. ? · You are sensitive to light. ? · You feel very sleepy or confused. ? Watch closely for changes in your health, and be sure to contact your doctor if:  ? · You do not get better as expected. Where can you learn more? Go to https://Clean Power FinancepeF?rsat Bu F?rsateb.Lyxia. org and sign in to your bookletmobile account. Enter J763 in the [facility] box to learn more about \"Viral Respiratory Infection: Care Instructions. \"     If you do not have an account, please click on the \"Sign Up Now\" link. Current as of: May 12, 2017  Content Version: 11.4  © 6213-8829 [employer]. Care instructions adapted under license by [facility] ([facility]). If you have questions about a medical condition or this instruction, always ask your healthcare professional. [address] any warranty or liability for your use of this information.

## 2017-12-12 NOTE — PROGRESS NOTES
[address]  [place], [address]  Phone [phone]   Fax [phone]      OFFICE VISIT: 2017    Nii Tabares- : 1980    Chief Complaint:  Jigna Hatfield is a 44 y.o. female who is here for Medication Refill; Cough; and Congestion     HPI  The patient presents today for monthly follow-up of anxiety. She is taking Klonopin 2 mg BID/prn. Her anxiety is stable. She is still grieving the loss of her dog. It has \"only\" been two months since the dog passed away. Reports cough and congestion. This has been occurring intermittently for the last few weeks. height is 5' 6\" (1.676 m) and weight is 166 lb 4 oz (75.4 kg). Her temporal temperature is 97.7 °F (36.5 °C). Her blood pressure is 100/70 and her pulse is 74. Her oxygen saturation is 99%. Body mass index is 26.83 kg/m². Results for orders placed or performed in visit on 10/06/17   POCT Rapid Drug Screen   Result Value Ref Range    Amphetamine Screen, Urine neg     Barbiturate Screen, Urine neg     Benzodiazepine Screen, Urine neg     COCAINE METABOLITE SCREEN URINE neg     THC neg     MDMA URINE neg     Methadone Screen, Urine neg     Opiate Scrn, Ur neg     Oxycodone Screen, Ur neg     PCP Scrn, Ur neg     Propoxyphene Screen, Urine neg     Tricyclic Antidepressants, Ur neg     Methamphetamine, Urine neg        I have reviewed the following with the Ms. Dillard   Lab Review   Office Visit on 10/06/2017   Component Date Value    Amphetamine Screen, Urine 10/06/2017 neg     Barbiturate Screen, Urine 10/06/2017 neg     Benzodiazepine Screen, U* 10/06/2017 neg     COCAINE METABOLITE SCREE* 10/06/2017 neg     THC 10/06/2017 neg     MDMA URINE 10/06/2017 neg     Methadone Screen, Urine 10/06/2017 neg     Opiate Scrn, Ur 10/06/2017 neg     Oxycodone Screen, Ur 10/06/2017 neg     PCP Scrn, Ur 10/06/2017 neg     Propoxyphene Screen, Uri*  neg     Tricyclic Antidepressant*  neg     Methamphetamine, Urine 10/06/2017 neg

## 2018-01-10 ENCOUNTER — OFFICE VISIT (OUTPATIENT)
Dept: PRIMARY CARE CLINIC | Age: 38
End: 2018-01-10
Payer: COMMERCIAL

## 2018-01-10 VITALS
TEMPERATURE: 99.6 F | SYSTOLIC BLOOD PRESSURE: 120 MMHG | BODY MASS INDEX: 25.15 KG/M2 | OXYGEN SATURATION: 98 % | DIASTOLIC BLOOD PRESSURE: 70 MMHG | WEIGHT: 156.5 LBS | HEIGHT: 66 IN | HEART RATE: 110 BPM

## 2018-01-10 DIAGNOSIS — Z72.0 TOBACCO ABUSE: ICD-10-CM

## 2018-01-10 DIAGNOSIS — F41.9 ANXIETY: ICD-10-CM

## 2018-01-10 DIAGNOSIS — F90.2 ATTENTION DEFICIT HYPERACTIVITY DISORDER (ADHD), COMBINED TYPE: ICD-10-CM

## 2018-01-10 DIAGNOSIS — F41.0 PANIC ATTACK: Primary | ICD-10-CM

## 2018-01-10 PROCEDURE — 99213 OFFICE O/P EST LOW 20 MIN: CPT | Performed by: NURSE PRACTITIONER

## 2018-01-10 RX ORDER — CLONAZEPAM 2 MG/1
2 TABLET ORAL 2 TIMES DAILY PRN
Qty: 60 TABLET | Refills: 0 | Status: SHIPPED | OUTPATIENT
Start: 2018-01-10 | End: 2018-02-13 | Stop reason: SDUPTHER

## 2018-01-10 ASSESSMENT — ENCOUNTER SYMPTOMS
VOMITING: 0
EYE REDNESS: 0
CONSTIPATION: 0
SORE THROAT: 0
RHINORRHEA: 0
VOICE CHANGE: 0
SHORTNESS OF BREATH: 0
DIARRHEA: 0
BACK PAIN: 1
COUGH: 1

## 2018-01-10 NOTE — PROGRESS NOTES
[address]  [place], [address]  Phone [phone]   Fax [phone]      OFFICE VISIT: 1/10/2018    Gertrudis Ro- : 1980    Chief Complaint:  Ghassan Park is a 44 y.o. female who is here for Medication Refill; Cough; and Congestion     HPI  The patient presents today for monthly follow-up. She takes Klonopin 2 mg BID/prn. She is needing a refill on this medication today. She did not sleep last night. She ran out of Klonopin. \"I am beating myself up. \"  \"I started smoking again after 2 years. \"  \"It is the enemy. \"    Reports an irritated throat. Reports cough and congestion. height is 5' 6\" (1.676 m) and weight is 156 lb 8 oz (71 kg). Her temporal temperature is 99.6 °F (37.6 °C). Her blood pressure is 120/70 and her pulse is 110. Her oxygen saturation is 98%. Body mass index is 25.26 kg/m². Results for orders placed or performed in visit on 10/06/17   POCT Rapid Drug Screen   Result Value Ref Range    Amphetamine Screen, Urine neg     Barbiturate Screen, Urine neg     Benzodiazepine Screen, Urine neg     COCAINE METABOLITE SCREEN URINE neg     THC neg     MDMA URINE neg     Methadone Screen, Urine neg     Opiate Scrn, Ur neg     Oxycodone Screen, Ur neg     PCP Scrn, Ur neg     Propoxyphene Screen, Urine neg     Tricyclic Antidepressants, Ur neg     Methamphetamine, Urine neg        I have reviewed the following with the Ms. Dillard   Lab Review   Office Visit on 10/06/2017   Component Date Value    Amphetamine Screen, Urine 10/06/2017 neg     Barbiturate Screen, Urine 10/06/2017 neg     Benzodiazepine Screen, U* 10/06/2017 neg     COCAINE METABOLITE SCREE* 10/06/2017 neg     THC 10/06/2017 neg     MDMA URINE 10/06/2017 neg     Methadone Screen, Urine 10/06/2017 neg     Opiate Scrn, Ur 10/06/2017 neg     Oxycodone Screen, Ur 10/06/2017 neg     PCP Scrn, Ur 10/06/2017 neg     Propoxyphene Screen, Uri*  neg     Tricyclic Antidepressant*  neg     Methamphetamine,

## 2018-01-22 NOTE — PATIENT INSTRUCTIONS
How Severe Is It?: mild Is This A New Presentation, Or A Follow-Up?: Follow Up Isotretinoin do anything that might cause an accident if you are not fully alert. Never play a relaxation tape while driving a car. When should you call for help? Call 911 anytime you think you may need emergency care. For example, call if:  ? · You feel you cannot stop from hurting yourself or someone else. ? Watch closely for changes in your health, and be sure to contact your doctor if:  ? · Your panic attacks get worse. ? · You have new or different anxiety. ? · You are not getting better as expected. Where can you learn more? Go to https://Sparkfly.[employer]. org and sign in to your [employer] account. Enter H601 in the [place] box to learn more about \"Panic Attacks: Care Instructions. \"     If you do not have an account, please click on the \"Sign Up Now\" link. Current as of: May 12, 2017  Content Version: 11.5  © 1440-6478 [employer], [employer]. Care instructions adapted under license by [facility] ([facility]). If you have questions about a medical condition or this instruction, always ask your healthcare professional. [place] [address] any warranty or liability for your use of this information.

## 2018-02-05 ENCOUNTER — TELEPHONE (OUTPATIENT)
Dept: PRIMARY CARE CLINIC | Age: 38
End: 2018-02-05

## 2018-02-05 NOTE — TELEPHONE ENCOUNTER
Pt called wanting ear gtts for ear ache. She gave me her new number and repeated it twice. Changed it in computer. When I went to call pt back, I was told that I had the wrong number. Pt needs to be seen.

## 2018-02-06 DIAGNOSIS — E28.2 PCOS (POLYCYSTIC OVARIAN SYNDROME): ICD-10-CM

## 2018-02-06 NOTE — TELEPHONE ENCOUNTER
Pt seen 1/10/18      Requested Prescriptions     Pending Prescriptions Disp Refills    metFORMIN (GLUCOPHAGE) 1000 MG tablet [Pharmacy Med Name: METFORMIN HCL 1,000 MG TABLET] 60 tablet 5     Sig: TAKE 1 TABLET BY MOUTH 2 TIMES DAILY (WITH MEALS)

## 2018-02-13 ENCOUNTER — OFFICE VISIT (OUTPATIENT)
Dept: PRIMARY CARE CLINIC | Age: 38
End: 2018-02-13
Payer: COMMERCIAL

## 2018-02-13 VITALS
DIASTOLIC BLOOD PRESSURE: 70 MMHG | BODY MASS INDEX: 24.77 KG/M2 | HEART RATE: 71 BPM | SYSTOLIC BLOOD PRESSURE: 100 MMHG | OXYGEN SATURATION: 98 % | HEIGHT: 66 IN | WEIGHT: 154.13 LBS | TEMPERATURE: 98.1 F

## 2018-02-13 DIAGNOSIS — Z00.00 ENCOUNTER FOR PREVENTIVE HEALTH EXAMINATION: ICD-10-CM

## 2018-02-13 DIAGNOSIS — F41.0 PANIC ATTACK: ICD-10-CM

## 2018-02-13 DIAGNOSIS — M79.2 NEUROPATHIC PAIN: ICD-10-CM

## 2018-02-13 DIAGNOSIS — F41.9 ANXIETY: Primary | ICD-10-CM

## 2018-02-13 DIAGNOSIS — Z11.4 SCREENING FOR HIV (HUMAN IMMUNODEFICIENCY VIRUS): ICD-10-CM

## 2018-02-13 DIAGNOSIS — S03.00XA DISLOCATION OF TEMPOROMANDIBULAR JOINT, INITIAL ENCOUNTER: ICD-10-CM

## 2018-02-13 PROCEDURE — 99213 OFFICE O/P EST LOW 20 MIN: CPT | Performed by: NURSE PRACTITIONER

## 2018-02-13 PROCEDURE — G8420 CALC BMI NORM PARAMETERS: HCPCS | Performed by: NURSE PRACTITIONER

## 2018-02-13 PROCEDURE — G8484 FLU IMMUNIZE NO ADMIN: HCPCS | Performed by: NURSE PRACTITIONER

## 2018-02-13 PROCEDURE — 1036F TOBACCO NON-USER: CPT | Performed by: NURSE PRACTITIONER

## 2018-02-13 PROCEDURE — G8427 DOCREV CUR MEDS BY ELIG CLIN: HCPCS | Performed by: NURSE PRACTITIONER

## 2018-02-13 RX ORDER — CLONAZEPAM 2 MG/1
2 TABLET ORAL 2 TIMES DAILY PRN
Qty: 60 TABLET | Refills: 0 | Status: SHIPPED | OUTPATIENT
Start: 2018-02-13 | End: 2018-04-25 | Stop reason: SDUPTHER

## 2018-02-13 RX ORDER — NEOMYCIN SULFATE, POLYMYXIN B SULFATE AND HYDROCORTISONE 10; 3.5; 1 MG/ML; MG/ML; [USP'U]/ML
SUSPENSION/ DROPS AURICULAR (OTIC)
Refills: 0 | COMMUNITY
Start: 2018-02-06 | End: 2018-03-13

## 2018-02-13 ASSESSMENT — ENCOUNTER SYMPTOMS
BACK PAIN: 1
VOMITING: 0
SHORTNESS OF BREATH: 0
SORE THROAT: 0
EYE REDNESS: 0
CONSTIPATION: 0
COUGH: 0
RHINORRHEA: 0
VOICE CHANGE: 0
DIARRHEA: 0

## 2018-02-13 NOTE — PROGRESS NOTES
[address]  [place], [address] [place] [address] Rd  Phone [phone]   Fax [phone]      OFFICE VISIT: 2018    María Hand- : 1980    Chief Complaint:  Brandon Nuñez is a 44 y.o. female who is here for Medication Refill and Hair/Scalp Problem     HPI  The patient presents today for monthly follow-up. She is needing a refill on her Klonopin. She is taking this twice a day, prn. Reports continued hair loss. She brought in some \"hair\" she lost in the shower last night. Her scalp has been itchy. Her pain is better controlled with the Cymbalta and Neurontin 400 mg BID. height is 5' 6\" (1.676 m) and weight is 154 lb 2 oz (69.9 kg). Her temporal temperature is 98.1 °F (36.7 °C). Her blood pressure is 100/70 and her pulse is 71. Her oxygen saturation is 98%. Body mass index is 24.88 kg/m². Results for orders placed or performed in visit on 10/06/17   POCT Rapid Drug Screen   Result Value Ref Range    Amphetamine Screen, Urine neg     Barbiturate Screen, Urine neg     Benzodiazepine Screen, Urine neg     COCAINE METABOLITE SCREEN URINE neg     THC neg     MDMA URINE neg     Methadone Screen, Urine neg     Opiate Scrn, Ur neg     Oxycodone Screen, Ur neg     PCP Scrn, Ur neg     Propoxyphene Screen, Urine neg     Tricyclic Antidepressants, Ur neg     Methamphetamine, Urine neg        I have reviewed the following with the MsDivina  Gilma   Lab Review   Office Visit on 10/06/2017   Component Date Value    Amphetamine Screen, Urine 10/06/2017 neg     Barbiturate Screen, Urine 10/06/2017 neg     Benzodiazepine Screen, U* 10/06/2017 neg     COCAINE METABOLITE SCREE* 10/06/2017 neg     THC 10/06/2017 neg     MDMA URINE 10/06/2017 neg     Methadone Screen, Urine 10/06/2017 neg     Opiate Scrn, Ur 10/06/2017 neg     Oxycodone Screen, Ur 10/06/2017 neg     PCP Scrn, Ur 10/06/2017 neg     Propoxyphene Screen, Uri*  neg     Tricyclic Antidepressant*  neg     Methamphetamine, Urine 10/06/2017 rhinorrhea, sneezing, sore throat and voice change. Eyes: Negative for redness. Respiratory: Negative for cough and shortness of breath. Cardiovascular: Negative for chest pain. Gastrointestinal: Negative for constipation, diarrhea and vomiting. Musculoskeletal: Positive for arthralgias, back pain and neck pain. Skin: Negative for rash. Neurological: Negative for dizziness and headaches. Psychiatric/Behavioral: Positive for decreased concentration (ADHD managed by Dr. Zhou Phillips) and sleep disturbance (improved). The patient is nervous/anxious (stable). Panic attacks--improved with Klonopin         Physical Exam   Constitutional: She appears well-developed. HENT:   Head: Normocephalic. Right Ear: Tympanic membrane and external ear normal.   Left Ear: Tympanic membrane and external ear normal.   Nose: Nose normal.   Mouth/Throat: Oropharynx is clear and moist. There is trismus (right, tenderness to palpate) in the jaw. Neck: Normal range of motion. Cardiovascular: Normal rate and regular rhythm. Pulmonary/Chest: Effort normal and breath sounds normal. No respiratory distress. She has no wheezes. She has no rales. Abdominal: Soft. Bowel sounds are normal.   Neurological: She is alert. Skin: Skin is dry. Psychiatric: Her mood appears anxious. Vitals reviewed. ASSESSMENT      ICD-10-CM ICD-9-CM    1. Anxiety F41.9 300.00 clonazePAM (KLONOPIN) 2 MG tablet   2. Panic attack F41.0 300.01 clonazePAM (KLONOPIN) 2 MG tablet   3. Neuropathic pain M79.2 729.2 The current medical regimen is effective;  continue present plan and medications. 4. Dislocation of temporomandibular joint, initial encounter S03. 00XA 830.0 See below         PLAN    No orders of the defined types were placed in this encounter. Return in about 1 month (around 3/13/2018), or if symptoms worsen or fail to improve.      Patient Instructions   Temporomandibular Joint Syndrome    TMJ is generally

## 2018-02-27 ENCOUNTER — TELEPHONE (OUTPATIENT)
Dept: PAIN MANAGEMENT | Age: 38
End: 2018-02-27

## 2018-03-13 ENCOUNTER — OFFICE VISIT (OUTPATIENT)
Dept: PRIMARY CARE CLINIC | Age: 38
End: 2018-03-13
Payer: COMMERCIAL

## 2018-03-13 ENCOUNTER — TELEPHONE (OUTPATIENT)
Dept: PRIMARY CARE CLINIC | Age: 38
End: 2018-03-13

## 2018-03-13 VITALS
HEIGHT: 66 IN | DIASTOLIC BLOOD PRESSURE: 70 MMHG | HEART RATE: 71 BPM | TEMPERATURE: 98.9 F | SYSTOLIC BLOOD PRESSURE: 100 MMHG | BODY MASS INDEX: 27.16 KG/M2 | WEIGHT: 169 LBS | OXYGEN SATURATION: 98 %

## 2018-03-13 DIAGNOSIS — Z01.419 WOMEN'S ANNUAL ROUTINE GYNECOLOGICAL EXAMINATION: Primary | ICD-10-CM

## 2018-03-13 DIAGNOSIS — Z79.899 MEDICATION MANAGEMENT: ICD-10-CM

## 2018-03-13 DIAGNOSIS — F41.0 PANIC ATTACK: ICD-10-CM

## 2018-03-13 DIAGNOSIS — Z12.4 ROUTINE PAPANICOLAOU SMEAR: ICD-10-CM

## 2018-03-13 DIAGNOSIS — Z00.00 ENCOUNTER FOR PREVENTIVE HEALTH EXAMINATION: ICD-10-CM

## 2018-03-13 DIAGNOSIS — G62.9 NEUROPATHY: ICD-10-CM

## 2018-03-13 DIAGNOSIS — M54.12 CERVICAL RADICULOPATHY: ICD-10-CM

## 2018-03-13 DIAGNOSIS — F41.9 ANXIETY: ICD-10-CM

## 2018-03-13 DIAGNOSIS — Z11.4 SCREENING FOR HIV (HUMAN IMMUNODEFICIENCY VIRUS): ICD-10-CM

## 2018-03-13 DIAGNOSIS — E78.2 MIXED HYPERLIPIDEMIA: ICD-10-CM

## 2018-03-13 DIAGNOSIS — Z72.0 TOBACCO ABUSE: ICD-10-CM

## 2018-03-13 LAB
ALBUMIN SERPL-MCNC: 4.4 G/DL (ref 3.5–5.2)
ALP BLD-CCNC: 56 U/L (ref 35–104)
ALT SERPL-CCNC: 8 U/L (ref 5–33)
ANION GAP SERPL CALCULATED.3IONS-SCNC: 15 MMOL/L (ref 7–19)
AST SERPL-CCNC: 16 U/L (ref 5–32)
BASOPHILS ABSOLUTE: 0 K/UL (ref 0–0.2)
BASOPHILS RELATIVE PERCENT: 0.6 % (ref 0–1)
BILIRUB SERPL-MCNC: <0.2 MG/DL (ref 0.2–1.2)
BUN BLDV-MCNC: 12 MG/DL (ref 6–20)
CALCIUM SERPL-MCNC: 9.4 MG/DL (ref 8.6–10)
CHLORIDE BLD-SCNC: 102 MMOL/L (ref 98–111)
CHOLESTEROL, TOTAL: 134 MG/DL (ref 160–199)
CO2: 26 MMOL/L (ref 22–29)
CREAT SERPL-MCNC: 0.7 MG/DL (ref 0.5–0.9)
EOSINOPHILS ABSOLUTE: 0.1 K/UL (ref 0–0.6)
EOSINOPHILS RELATIVE PERCENT: 0.8 % (ref 0–5)
GFR NON-AFRICAN AMERICAN: >60
GLUCOSE BLD-MCNC: 70 MG/DL (ref 74–109)
HCT VFR BLD CALC: 36 % (ref 37–47)
HDLC SERPL-MCNC: 51 MG/DL (ref 65–121)
HEMOGLOBIN: 11.2 G/DL (ref 12–16)
LDL CHOLESTEROL CALCULATED: 59 MG/DL
LYMPHOCYTES ABSOLUTE: 1.6 K/UL (ref 1.1–4.5)
LYMPHOCYTES RELATIVE PERCENT: 24.3 % (ref 20–40)
MCH RBC QN AUTO: 29.2 PG (ref 27–31)
MCHC RBC AUTO-ENTMCNC: 31.1 G/DL (ref 33–37)
MCV RBC AUTO: 93.8 FL (ref 81–99)
MONOCYTES ABSOLUTE: 0.6 K/UL (ref 0–0.9)
MONOCYTES RELATIVE PERCENT: 8.6 % (ref 0–10)
NEUTROPHILS ABSOLUTE: 4.2 K/UL (ref 1.5–7.5)
NEUTROPHILS RELATIVE PERCENT: 65.4 % (ref 50–65)
PDW BLD-RTO: 13.9 % (ref 11.5–14.5)
PLATELET # BLD: 295 K/UL (ref 130–400)
PMV BLD AUTO: 11.3 FL (ref 9.4–12.3)
POTASSIUM SERPL-SCNC: 4.2 MMOL/L (ref 3.5–5)
RAPID HIV 1&2: NORMAL
RBC # BLD: 3.84 M/UL (ref 4.2–5.4)
SODIUM BLD-SCNC: 143 MMOL/L (ref 136–145)
T4 FREE: 1.2 NG/DL (ref 0.9–1.7)
TOTAL PROTEIN: 7 G/DL (ref 6.6–8.7)
TRIGL SERPL-MCNC: 121 MG/DL (ref 0–149)
TSH SERPL DL<=0.05 MIU/L-ACNC: 0.27 UIU/ML (ref 0.27–4.2)
WBC # BLD: 6.4 K/UL (ref 4.8–10.8)

## 2018-03-13 PROCEDURE — 99406 BEHAV CHNG SMOKING 3-10 MIN: CPT | Performed by: NURSE PRACTITIONER

## 2018-03-13 PROCEDURE — 99395 PREV VISIT EST AGE 18-39: CPT | Performed by: NURSE PRACTITIONER

## 2018-03-13 RX ORDER — GABAPENTIN 600 MG/1
600 TABLET ORAL 3 TIMES DAILY
Qty: 90 TABLET | Refills: 3 | Status: SHIPPED | OUTPATIENT
Start: 2018-03-13 | End: 2018-07-13 | Stop reason: ALTCHOICE

## 2018-03-13 RX ORDER — LORAZEPAM 1 MG/1
1 TABLET ORAL EVERY 8 HOURS PRN
Qty: 60 TABLET | Refills: 0 | Status: SHIPPED | OUTPATIENT
Start: 2018-03-13 | End: 2018-04-12

## 2018-03-13 RX ORDER — CLONAZEPAM 2 MG/1
2 TABLET ORAL 2 TIMES DAILY PRN
Qty: 60 TABLET | Refills: 0 | Status: CANCELLED | OUTPATIENT
Start: 2018-03-13 | End: 2018-04-12

## 2018-03-13 ASSESSMENT — ENCOUNTER SYMPTOMS
EYE REDNESS: 0
VOMITING: 0
CONSTIPATION: 0
SORE THROAT: 0
COUGH: 0
DIARRHEA: 0
VOICE CHANGE: 0
SHORTNESS OF BREATH: 0
RHINORRHEA: 0
BACK PAIN: 1

## 2018-03-13 NOTE — PROGRESS NOTES
neg     Methadone Screen, Urine neg     Opiate Scrn, Ur neg     Oxycodone Screen, Ur neg     PCP Scrn, Ur neg     Propoxyphene Screen, Urine neg     Tricyclic Antidepressants, Ur neg     Methamphetamine, Urine neg        I have reviewed the following with the Ms. Dillard   Lab Review   Office Visit on 10/06/2017   Component Date Value    Amphetamine Screen, Urine 10/06/2017 neg     Barbiturate Screen, Urine 10/06/2017 neg     Benzodiazepine Screen, U* 10/06/2017 neg     COCAINE METABOLITE SCREE* 10/06/2017 neg     THC 10/06/2017 neg     MDMA URINE 10/06/2017 neg     Methadone Screen, Urine 10/06/2017 neg     Opiate Scrn, Ur 10/06/2017 neg     Oxycodone Screen, Ur 10/06/2017 neg     PCP Scrn, Ur 10/06/2017 neg     Propoxyphene Screen, Uri* 59/30/4304 neg     Tricyclic Antidepressant* 09/66/5942 neg     Methamphetamine, Urine 10/06/2017 neg      Copies of these are in the chart. Prior to Visit Medications    Medication Sig Taking? Authorizing Provider   gabapentin (NEURONTIN) 600 MG tablet Take 1 tablet by mouth 3 times daily for 30 days. Yes RYAN Peters   LORazepam (ATIVAN) 1 MG tablet Take 1 tablet by mouth every 8 hours as needed for Anxiety for up to 30 days. Yes RYAN Peters   clonazePAM (KLONOPIN) 2 MG tablet Take 1 tablet by mouth 2 times daily as needed (anxiety) for up to 30 days.  Yes RYAN Peters   metFORMIN (GLUCOPHAGE) 1000 MG tablet Take 1 tablet by mouth 2 times daily (with meals) Yes RYAN Peters   Prenatal MV-Min-Fe Fum-FA-DHA (PRENATAL 1 PO) Take by mouth Yes Historical Provider, MD   DULoxetine (CYMBALTA) 60 MG extended release capsule Take 1 capsule by mouth daily Yes RYAN Peters   simvastatin (ZOCOR) 20 MG tablet Take 1 tablet by mouth every evening Yes RYAN Monreal   amphetamine-dextroamphetamine (ADDERALL) 10 MG tablet  Yes Historical Provider, MD   amphetamine-dextroamphetamine (ADDERALL XR) 30 MG extended release capsule  Yes Historical Provider, MD       Allergies: Amoxicillin    Past Medical History:   Diagnosis Date    ADHD (attention deficit hyperactivity disorder)     Anxiety     Arthritis     Chronic back pain     Lichen sclerosus     PCOS (polycystic ovarian syndrome)        Past Surgical History:   Procedure Laterality Date    COLONOSCOPY  11/16/2016    Dr Pal[facility])-normal, thick stool in right colon, 5-10 yr recall    UTERINE SUSPENSION  2001       Social History   Substance Use Topics    Smoking status: Former Smoker    Smokeless tobacco: Never Used    Alcohol use 0.0 oz/week       Review of Systems   Constitutional: Negative for chills, fatigue and fever. HENT: Negative for congestion, ear pain, rhinorrhea, sneezing, sore throat and voice change. Eyes: Negative for redness. Respiratory: Negative for cough and shortness of breath. Cardiovascular: Negative for chest pain. Gastrointestinal: Negative for constipation, diarrhea and vomiting. Musculoskeletal: Positive for arthralgias, back pain and neck pain. Skin: Negative for rash. Neurological: Positive for numbness. Negative for dizziness and headaches. Psychiatric/Behavioral: Positive for decreased concentration (ADHD managed by Dr. Orquidea Best) and sleep disturbance (worse). The patient is nervous/anxious (worse). Panic attacks--worse       Physical Exam   Constitutional: She appears well-developed. HENT:   Head: Normocephalic. Right Ear: Tympanic membrane and external ear normal.   Left Ear: Tympanic membrane and external ear normal.   Nose: Nose normal.   Mouth/Throat: Oropharynx is clear and moist.   Neck: Normal range of motion. Carotid bruit is not present. Cardiovascular: Normal rate and regular rhythm. Pulmonary/Chest: Effort normal and breath sounds normal. No respiratory distress. She has no wheezes. She has no rales. Right breast exhibits no inverted nipple, no mass and no nipple discharge.  Left breast exhibits no APRN

## 2018-03-14 ENCOUNTER — TELEPHONE (OUTPATIENT)
Dept: PRIMARY CARE CLINIC | Age: 38
End: 2018-03-14

## 2018-03-14 ENCOUNTER — HOSPITAL ENCOUNTER (OUTPATIENT)
Age: 38
Setting detail: SPECIMEN
Discharge: HOME | End: 2018-03-14
Payer: COMMERCIAL

## 2018-03-14 PROCEDURE — 88142 CYTOPATH C/V THIN LAYER: CPT

## 2018-03-15 ENCOUNTER — TELEPHONE (OUTPATIENT)
Dept: PRIMARY CARE CLINIC | Age: 38
End: 2018-03-15

## 2018-03-16 NOTE — TELEPHONE ENCOUNTER
Called patient, spoke with: Patient regarding the results of the patients most recent pap smear. I advised Patient of Francine Hathaway recommendations.    Patient did voice understanding

## 2018-04-09 RX ORDER — SIMVASTATIN 20 MG
20 TABLET ORAL EVERY EVENING
Qty: 30 TABLET | Refills: 11 | Status: SHIPPED | OUTPATIENT
Start: 2018-04-09 | End: 2019-04-27 | Stop reason: SDUPTHER

## 2018-04-25 ENCOUNTER — OFFICE VISIT (OUTPATIENT)
Dept: PRIMARY CARE CLINIC | Age: 38
End: 2018-04-25
Payer: COMMERCIAL

## 2018-04-25 VITALS
TEMPERATURE: 98 F | OXYGEN SATURATION: 98 % | BODY MASS INDEX: 28.61 KG/M2 | WEIGHT: 178 LBS | HEART RATE: 135 BPM | DIASTOLIC BLOOD PRESSURE: 80 MMHG | SYSTOLIC BLOOD PRESSURE: 120 MMHG | HEIGHT: 66 IN

## 2018-04-25 DIAGNOSIS — F41.0 PANIC ATTACK: ICD-10-CM

## 2018-04-25 DIAGNOSIS — F51.05 INSOMNIA DUE TO OTHER MENTAL DISORDER: Primary | ICD-10-CM

## 2018-04-25 DIAGNOSIS — F41.9 ANXIETY: ICD-10-CM

## 2018-04-25 DIAGNOSIS — F99 INSOMNIA DUE TO OTHER MENTAL DISORDER: Primary | ICD-10-CM

## 2018-04-25 DIAGNOSIS — M54.12 CERVICAL RADICULOPATHY: ICD-10-CM

## 2018-04-25 DIAGNOSIS — G62.9 NEUROPATHY: ICD-10-CM

## 2018-04-25 PROCEDURE — G8417 CALC BMI ABV UP PARAM F/U: HCPCS | Performed by: NURSE PRACTITIONER

## 2018-04-25 PROCEDURE — 1036F TOBACCO NON-USER: CPT | Performed by: NURSE PRACTITIONER

## 2018-04-25 PROCEDURE — 99213 OFFICE O/P EST LOW 20 MIN: CPT | Performed by: NURSE PRACTITIONER

## 2018-04-25 PROCEDURE — G8427 DOCREV CUR MEDS BY ELIG CLIN: HCPCS | Performed by: NURSE PRACTITIONER

## 2018-04-25 RX ORDER — GABAPENTIN 600 MG/1
600 TABLET ORAL 3 TIMES DAILY
Qty: 90 TABLET | Refills: 3 | Status: CANCELLED | OUTPATIENT
Start: 2018-04-25 | End: 2018-05-25

## 2018-04-25 RX ORDER — CLONAZEPAM 2 MG/1
2 TABLET ORAL 2 TIMES DAILY PRN
Qty: 60 TABLET | Refills: 0 | Status: SHIPPED | OUTPATIENT
Start: 2018-04-25 | End: 2018-06-06 | Stop reason: SDUPTHER

## 2018-04-25 ASSESSMENT — ENCOUNTER SYMPTOMS
COUGH: 0
DIARRHEA: 0
VOICE CHANGE: 0
SORE THROAT: 0
CONSTIPATION: 0
EYE REDNESS: 0
RHINORRHEA: 0
VOMITING: 0
SHORTNESS OF BREATH: 0
BACK PAIN: 1

## 2018-04-25 ASSESSMENT — PATIENT HEALTH QUESTIONNAIRE - PHQ9
2. FEELING DOWN, DEPRESSED OR HOPELESS: 1
SUM OF ALL RESPONSES TO PHQ9 QUESTIONS 1 & 2: 1
1. LITTLE INTEREST OR PLEASURE IN DOING THINGS: 0
SUM OF ALL RESPONSES TO PHQ QUESTIONS 1-9: 1

## 2018-06-06 ENCOUNTER — OFFICE VISIT (OUTPATIENT)
Dept: PRIMARY CARE CLINIC | Age: 38
End: 2018-06-06
Payer: COMMERCIAL

## 2018-06-06 VITALS
OXYGEN SATURATION: 97 % | DIASTOLIC BLOOD PRESSURE: 76 MMHG | HEIGHT: 66 IN | TEMPERATURE: 98.2 F | HEART RATE: 112 BPM | BODY MASS INDEX: 31.04 KG/M2 | SYSTOLIC BLOOD PRESSURE: 134 MMHG | WEIGHT: 193.12 LBS

## 2018-06-06 DIAGNOSIS — F41.9 ANXIETY: Primary | ICD-10-CM

## 2018-06-06 DIAGNOSIS — F41.0 PANIC ATTACK: ICD-10-CM

## 2018-06-06 DIAGNOSIS — F99 INSOMNIA DUE TO OTHER MENTAL DISORDER: ICD-10-CM

## 2018-06-06 DIAGNOSIS — F51.05 INSOMNIA DUE TO OTHER MENTAL DISORDER: ICD-10-CM

## 2018-06-06 PROCEDURE — 99213 OFFICE O/P EST LOW 20 MIN: CPT | Performed by: NURSE PRACTITIONER

## 2018-06-06 PROCEDURE — G8427 DOCREV CUR MEDS BY ELIG CLIN: HCPCS | Performed by: NURSE PRACTITIONER

## 2018-06-06 PROCEDURE — 1036F TOBACCO NON-USER: CPT | Performed by: NURSE PRACTITIONER

## 2018-06-06 PROCEDURE — G8417 CALC BMI ABV UP PARAM F/U: HCPCS | Performed by: NURSE PRACTITIONER

## 2018-06-06 RX ORDER — CLONAZEPAM 2 MG/1
2 TABLET ORAL 2 TIMES DAILY PRN
Qty: 60 TABLET | Refills: 0 | Status: SHIPPED | OUTPATIENT
Start: 2018-06-06 | End: 2018-07-13 | Stop reason: SDUPTHER

## 2018-06-06 ASSESSMENT — ENCOUNTER SYMPTOMS
RHINORRHEA: 0
CONSTIPATION: 0
VOMITING: 0
BACK PAIN: 1
EYE REDNESS: 0
DIARRHEA: 0
SHORTNESS OF BREATH: 0
SORE THROAT: 0
VOICE CHANGE: 0
COUGH: 0

## 2018-07-13 ENCOUNTER — OFFICE VISIT (OUTPATIENT)
Dept: PRIMARY CARE CLINIC | Age: 38
End: 2018-07-13
Payer: COMMERCIAL

## 2018-07-13 VITALS
SYSTOLIC BLOOD PRESSURE: 121 MMHG | WEIGHT: 194 LBS | DIASTOLIC BLOOD PRESSURE: 85 MMHG | HEART RATE: 103 BPM | TEMPERATURE: 98.6 F | HEIGHT: 66 IN | OXYGEN SATURATION: 98 % | BODY MASS INDEX: 31.18 KG/M2

## 2018-07-13 DIAGNOSIS — F99 INSOMNIA DUE TO OTHER MENTAL DISORDER: Primary | ICD-10-CM

## 2018-07-13 DIAGNOSIS — F51.05 INSOMNIA DUE TO OTHER MENTAL DISORDER: Primary | ICD-10-CM

## 2018-07-13 DIAGNOSIS — F41.9 ANXIETY: ICD-10-CM

## 2018-07-13 DIAGNOSIS — E78.2 MIXED HYPERLIPIDEMIA: ICD-10-CM

## 2018-07-13 DIAGNOSIS — F41.0 PANIC ATTACK: ICD-10-CM

## 2018-07-13 PROCEDURE — 1036F TOBACCO NON-USER: CPT | Performed by: NURSE PRACTITIONER

## 2018-07-13 PROCEDURE — G8417 CALC BMI ABV UP PARAM F/U: HCPCS | Performed by: NURSE PRACTITIONER

## 2018-07-13 PROCEDURE — G8427 DOCREV CUR MEDS BY ELIG CLIN: HCPCS | Performed by: NURSE PRACTITIONER

## 2018-07-13 PROCEDURE — 99213 OFFICE O/P EST LOW 20 MIN: CPT | Performed by: NURSE PRACTITIONER

## 2018-07-13 RX ORDER — CLONAZEPAM 2 MG/1
2 TABLET ORAL 2 TIMES DAILY PRN
Qty: 60 TABLET | Refills: 0 | Status: SHIPPED | OUTPATIENT
Start: 2018-07-13 | End: 2018-08-10 | Stop reason: SDUPTHER

## 2018-07-13 ASSESSMENT — ENCOUNTER SYMPTOMS
CONSTIPATION: 0
DIARRHEA: 0
SHORTNESS OF BREATH: 0
COUGH: 0
VOMITING: 0
VOICE CHANGE: 0
RHINORRHEA: 0
EYE REDNESS: 0
SORE THROAT: 0
BACK PAIN: 1

## 2018-07-13 NOTE — PROGRESS NOTES
3.5 - 5.0 mmol/L    Chloride 102 98 - 111 mmol/L    CO2 26 22 - 29 mmol/L    Anion Gap 15 7 - 19 mmol/L    Glucose 70 (L) 74 - 109 mg/dL    BUN 12 6 - 20 mg/dL    CREATININE 0.7 0.5 - 0.9 mg/dL    GFR Non-African American >60 >60    Calcium 9.4 8.6 - 10.0 mg/dL    Total Protein 7.0 6.6 - 8.7 g/dL    Alb 4.4 3.5 - 5.2 g/dL    Total Bilirubin <0.2 0.2 - 1.2 mg/dL    Alkaline Phosphatase 56 35 - 104 U/L    ALT 8 5 - 33 U/L    AST 16 5 - 32 U/L   T4, Free   Result Value Ref Range    T4 Free 1.2 0.9 - 1.7 ng/dL   TSH without Reflex   Result Value Ref Range    TSH 0.272 0.270 - 4.200 uIU/mL   HIV Rapid 1&2   Result Value Ref Range    Rapid HIV 1&2 Non-reactive Non-reactive       I have reviewed the following with the Ms. Dillard   Lab Review   Orders Only on 03/13/2018   Component Date Value    Cholesterol, Total 03/13/2018 134*    Triglycerides 03/13/2018 121     HDL 03/13/2018 51*    LDL Calculated 03/13/2018 59     WBC 03/13/2018 6.4     RBC 03/13/2018 3.84*    Hemoglobin 03/13/2018 11.2*    Hematocrit 03/13/2018 36.0*    MCV 03/13/2018 93.8     MCH 03/13/2018 29.2     MCHC 03/13/2018 31.1*    RDW 03/13/2018 13.9     Platelets 19/86/7442 295     MPV 03/13/2018 11.3     Neutrophils % 03/13/2018 65.4*    Lymphocytes % 03/13/2018 24.3     Monocytes % 03/13/2018 8.6     Eosinophils % 03/13/2018 0.8     Basophils % 03/13/2018 0.6     Neutrophils # 03/13/2018 4.2     Lymphocytes # 03/13/2018 1.6     Monocytes # 03/13/2018 0.60     Eosinophils # 03/13/2018 0.10     Basophils # 03/13/2018 0.00     Sodium 03/13/2018 143     Potassium 03/13/2018 4.2     Chloride 03/13/2018 102     CO2 03/13/2018 26     Anion Gap 03/13/2018 15     Glucose 03/13/2018 70*    BUN 03/13/2018 12     CREATININE 03/13/2018 0.7     GFR Non- 03/13/2018 >60     Calcium 03/13/2018 9.4     Total Protein 03/13/2018 7.0     Alb 03/13/2018 4.4     Total Bilirubin 03/13/2018 <0.2     Alkaline Phosphatase concentration (ADHD managed by Dr. Mariellen Bumpers) and sleep disturbance (improved with Klonopin). The patient is nervous/anxious (stable). Panic attacks--stable       Physical Exam   Constitutional: She appears well-developed. HENT:   Head: Normocephalic. Right Ear: Tympanic membrane and external ear normal.   Left Ear: Tympanic membrane and external ear normal.   Nose: Nose normal.   Mouth/Throat: Oropharynx is clear and moist.   Neck: Normal range of motion. Carotid bruit is not present. Cardiovascular: Normal rate and regular rhythm. Pulmonary/Chest: Effort normal and breath sounds normal. No respiratory distress. She has no wheezes. She has no rales. Abdominal: Soft. Bowel sounds are normal.   Genitourinary: Vagina normal and uterus normal.   Musculoskeletal:        Cervical back: She exhibits tenderness and pain. Neurological: She is alert. Skin: Skin is dry. Psychiatric: She has a normal mood and affect. Her speech is normal and behavior is normal. Thought content normal.   Vitals reviewed. ASSESSMENT      ICD-10-CM ICD-9-CM    1. Insomnia due to other mental disorder F51.05 300.9 clonazePAM (KLONOPIN) 2 MG tablet    F99 327.02    2. Anxiety F41.9 300.00 clonazePAM (KLONOPIN) 2 MG tablet  Continue Cymbalta   3. Panic attack F41.0 300.01 clonazePAM (KLONOPIN) 2 MG tablet   4. Mixed hyperlipidemia E78.2 272.2 Continue Zocor         PLAN    No orders of the defined types were placed in this encounter. Return in about 1 month (around 8/13/2018), or if symptoms worsen or fail to improve. Patient Instructions   Eat a variety of foods every day. Replace red meat with fish, poultry, and soy protein (like tofu). Limit processed and packaged foods like chips, crackers, and cookies. Bake, broil, or steam foods. Don't abrams them. Limit foods high in cholesterol. These include egg yolks. Be physically active. Get at least 30 minutes of exercise on most days of the week.  Walking is a good choice. You also may want to do other activities, such as running, swimming, cycling, or playing tennis or team sports. Stay at a healthy weight or lose weight by making the changes in eating and physical activity listed above. Losing just a small amount of weight, even 5 to 10 pounds, can reduce your risk for having a heart attack or stroke. Controlled Substances Monitoring: Attestation: The Prescription Monitoring Report for this patient was reviewed today. ([id number]) RYAN Cohen)  Documentation: Possible medication side effects, risk of tolerance/dependence & alternative treatments discussed. RYAN Cohen)            Additional Instructions: As always, patient is advised to bring in medication bottles in order to correctly reconcile with our current list.    Tracy Miranda received counseling on the following healthy behaviors: n/a    Patient given educational materials on dx    I have instructed Vickie to complete a self tracking handout on n/a and instructed them to bring it with them to her next appointment. Discussed use, benefit, and side effects of prescribed medications. Barriers to medication compliance addressed. All patient questions answered. Pt voiced understanding.      RYAN Cohen

## 2018-07-17 ENCOUNTER — TELEPHONE (OUTPATIENT)
Dept: PRIMARY CARE CLINIC | Age: 38
End: 2018-07-17

## 2018-07-25 DIAGNOSIS — F41.9 ANXIETY: ICD-10-CM

## 2018-07-25 RX ORDER — DULOXETIN HYDROCHLORIDE 30 MG/1
CAPSULE, DELAYED RELEASE ORAL
Qty: 30 CAPSULE | Refills: 6 | Status: SHIPPED | OUTPATIENT
Start: 2018-07-25 | End: 2018-11-16 | Stop reason: SDUPTHER

## 2018-08-10 ENCOUNTER — OFFICE VISIT (OUTPATIENT)
Dept: PRIMARY CARE CLINIC | Age: 38
End: 2018-08-10
Payer: COMMERCIAL

## 2018-08-10 VITALS
SYSTOLIC BLOOD PRESSURE: 110 MMHG | HEART RATE: 76 BPM | DIASTOLIC BLOOD PRESSURE: 65 MMHG | TEMPERATURE: 97.6 F | WEIGHT: 181.25 LBS | BODY MASS INDEX: 29.13 KG/M2 | OXYGEN SATURATION: 97 % | HEIGHT: 66 IN

## 2018-08-10 DIAGNOSIS — F41.0 PANIC ATTACK: Primary | ICD-10-CM

## 2018-08-10 DIAGNOSIS — F99 INSOMNIA DUE TO OTHER MENTAL DISORDER: ICD-10-CM

## 2018-08-10 DIAGNOSIS — F51.05 INSOMNIA DUE TO OTHER MENTAL DISORDER: ICD-10-CM

## 2018-08-10 DIAGNOSIS — F41.9 ANXIETY: ICD-10-CM

## 2018-08-10 PROCEDURE — G8417 CALC BMI ABV UP PARAM F/U: HCPCS | Performed by: NURSE PRACTITIONER

## 2018-08-10 PROCEDURE — 1036F TOBACCO NON-USER: CPT | Performed by: NURSE PRACTITIONER

## 2018-08-10 PROCEDURE — 99213 OFFICE O/P EST LOW 20 MIN: CPT | Performed by: NURSE PRACTITIONER

## 2018-08-10 PROCEDURE — G8427 DOCREV CUR MEDS BY ELIG CLIN: HCPCS | Performed by: NURSE PRACTITIONER

## 2018-08-10 RX ORDER — CLONAZEPAM 2 MG/1
2 TABLET ORAL 2 TIMES DAILY PRN
Qty: 60 TABLET | Refills: 0 | Status: SHIPPED | OUTPATIENT
Start: 2018-08-10 | End: 2018-09-14 | Stop reason: SDUPTHER

## 2018-08-10 ASSESSMENT — ENCOUNTER SYMPTOMS
COUGH: 0
SORE THROAT: 0
EYE REDNESS: 0
RHINORRHEA: 0
VOICE CHANGE: 0
CONSTIPATION: 0
SHORTNESS OF BREATH: 0
DIARRHEA: 0
VOMITING: 0

## 2018-08-10 NOTE — PATIENT INSTRUCTIONS
disintegrating tablet:  · Keep the tablet in its blister pack until you are ready to take it. Open the package and peel back the foil. Do not push a tablet through the foil or you may damage the tablet. · Use dry hands to remove the tablet and place it in your mouth. · Do not swallow the tablet whole. Allow it to dissolve in your mouth without chewing. · Swallow several times as the tablet dissolves. If desired, you may drink liquid to help swallow the dissolved tablet. If you use this medicine long-term, you may need frequent medical tests. Do not stop using clonazepam suddenly or you could have unpleasant withdrawal symptoms, including a seizure (convulsions). Ask your doctor how to safely stop using this medicine. Call your doctor if this medicine seems to stop working as well in treating your seizures or anxiety symptoms. Seizures are often treated with a combination of drugs. Use all medications as directed by your doctor. Read the medication guide or patient instructions provided with each medication. Do not change your doses or medication schedule without your doctor's advice. Store at room temperature away from moisture, heat, and light. Keep track of the amount of medicine used from each new bottle. Clonazepam is a drug of abuse and you should be aware if anyone is using your medicine improperly or without a prescription. What happens if I miss a dose? Take the missed dose as soon as you remember. Skip the missed dose if it is almost time for your next scheduled dose. Do not take extra medicine to make up the missed dose. What happens if I overdose? Seek emergency medical attention or call the Poison Help line at 1-544.376.8924. Overdose symptoms may include extreme drowsiness, confusion, muscle weakness, fainting, or coma. What should I avoid while taking clonazepam?  Avoid drinking alcohol. Dangerous side effects could occur. Clonazepam may impair your thinking or reactions.  [employer] Version: 8.01. Revision date: 11/6/2017. Care instructions adapted under license by [facility] ([facility]). If you have questions about a medical condition or this instruction, always ask your healthcare professional. [address] any warranty or liability for your use of this information.

## 2018-08-10 NOTE — PROGRESS NOTES
Result Value Ref Range    Sodium 143 136 - 145 mmol/L    Potassium 4.2 3.5 - 5.0 mmol/L    Chloride 102 98 - 111 mmol/L    CO2 26 22 - 29 mmol/L    Anion Gap 15 7 - 19 mmol/L    Glucose 70 (L) 74 - 109 mg/dL    BUN 12 6 - 20 mg/dL    CREATININE 0.7 0.5 - 0.9 mg/dL    GFR Non-African American >60 >60    Calcium 9.4 8.6 - 10.0 mg/dL    Total Protein 7.0 6.6 - 8.7 g/dL    Alb 4.4 3.5 - 5.2 g/dL    Total Bilirubin <0.2 0.2 - 1.2 mg/dL    Alkaline Phosphatase 56 35 - 104 U/L    ALT 8 5 - 33 U/L    AST 16 5 - 32 U/L   T4, Free   Result Value Ref Range    T4 Free 1.2 0.9 - 1.7 ng/dL   TSH without Reflex   Result Value Ref Range    TSH 0.272 0.270 - 4.200 uIU/mL   HIV Rapid 1&2   Result Value Ref Range    Rapid HIV 1&2 Non-reactive Non-reactive       I have reviewed the following with the Ms. Dillard   Lab Review   Orders Only on 03/13/2018   Component Date Value    Cholesterol, Total 03/13/2018 134*    Triglycerides 03/13/2018 121     HDL 03/13/2018 51*    LDL Calculated 03/13/2018 59     WBC 03/13/2018 6.4     RBC 03/13/2018 3.84*    Hemoglobin 03/13/2018 11.2*    Hematocrit 03/13/2018 36.0*    MCV 03/13/2018 93.8     MCH 03/13/2018 29.2     MCHC 03/13/2018 31.1*    RDW 03/13/2018 13.9     Platelets 62/98/0977 295     MPV 03/13/2018 11.3     Neutrophils % 03/13/2018 65.4*    Lymphocytes % 03/13/2018 24.3     Monocytes % 03/13/2018 8.6     Eosinophils % 03/13/2018 0.8     Basophils % 03/13/2018 0.6     Neutrophils # 03/13/2018 4.2     Lymphocytes # 03/13/2018 1.6     Monocytes # 03/13/2018 0.60     Eosinophils # 03/13/2018 0.10     Basophils # 03/13/2018 0.00     Sodium 03/13/2018 143     Potassium 03/13/2018 4.2     Chloride 03/13/2018 102     CO2 03/13/2018 26     Anion Gap 03/13/2018 15     Glucose 03/13/2018 70*    BUN 03/13/2018 12     CREATININE 03/13/2018 0.7     GFR Non- 03/13/2018 >60     Calcium 03/13/2018 9.4     Total Protein 03/13/2018 7.0     Alb 03/13/2018 instructions about taking seizure medication if you are pregnant. Do not start or stop taking this medicine without your doctor's advice, and tell your doctor right away if you become pregnant. Clonazepam may cause harm to an unborn baby, and may cause breathing or feeding problems in a . But having seizures during pregnancy could harm both mother and baby. If you are pregnant, your name may be listed on a pregnancy registry. This is to track the outcome of the pregnancy and to evaluate any effects of clonazepam on the baby. Clonazepam can pass into breast milk, but effects on the nursing baby are not known. Tell your doctor if you are breast-feeding. Do not give this medicine to a child without medical advice. Clonazepam is not approved to treat panic disorder in anyone younger than 25years old. How should I take clonazepam?  Follow all directions on your prescription label. Your doctor may occasionally change your dose to make sure you get the best results. Do not use this medicine in larger or smaller amounts or for longer than recommended. Clonazepam may be habit-forming. Never share this medicine with another person, especially someone with a history of drug abuse or addiction. Keep the medication in a place where others cannot get to it. Misuse of habit-forming medicine can cause addiction, overdose, or death. Selling or giving away this medicine is against the law. Clonazepam should be used for only a short time. Do not take this medication for longer than 9 weeks without your doctor's advice. Swallow the regular clonazepam tablet whole, with a full glass of water. To take the orally disintegrating tablet:  · Keep the tablet in its blister pack until you are ready to take it. Open the package and peel back the foil. Do not push a tablet through the foil or you may damage the tablet. · Use dry hands to remove the tablet and place it in your mouth. · Do not swallow the tablet whole.  Allow it reach of children, never share your medicines with others, and use this medication only for the indication prescribed. Every effort has been made to ensure that the information provided by [address] is accurate, up-to-date, and complete, but no guarantee is made to that effect. Drug information contained herein may be time sensitive. [facility] information has been compiled for use by healthcare practitioners and consumers in the [place] and therefore [facility] does not warrant that uses outside of the [place] are appropriate, unless specifically indicated otherwise. [facility]'s drug information does not endorse drugs, diagnose patients or recommend therapy. [facility]'s drug information is an informational resource designed to assist licensed healthcare practitioners in caring for their patients and/or to serve consumers viewing this service as a supplement to, and not a substitute for, the expertise, skill, knowledge and judgment of healthcare practitioners. The absence of a warning for a given drug or drug combination in no way should be construed to indicate that the drug or drug combination is safe, effective or appropriate for any given patient. [facility] does not assume any responsibility for any aspect of healthcare administered with the aid of information [facility] provides. The information contained herein is not intended to cover all possible uses, directions, precautions, warnings, drug interactions, allergic reactions, or adverse effects. If you have questions about the drugs you are taking, check with your doctor, nurse or pharmacist.  Copyright 9151-0215 [address] Version: 8.01. Revision date: 11/6/2017. Care instructions adapted under license by [facility] ([facility]). If you have questions about a medical condition or this instruction, always ask your healthcare professional. [place] [address] any warranty or liability for your use of this information.

## 2018-09-14 ENCOUNTER — OFFICE VISIT (OUTPATIENT)
Dept: PRIMARY CARE CLINIC | Age: 38
End: 2018-09-14
Payer: COMMERCIAL

## 2018-09-14 VITALS
DIASTOLIC BLOOD PRESSURE: 82 MMHG | BODY MASS INDEX: 32.18 KG/M2 | OXYGEN SATURATION: 96 % | SYSTOLIC BLOOD PRESSURE: 134 MMHG | HEIGHT: 66 IN | TEMPERATURE: 99.2 F | WEIGHT: 200.25 LBS | HEART RATE: 117 BPM

## 2018-09-14 DIAGNOSIS — M54.6 CHRONIC BILATERAL THORACIC BACK PAIN: ICD-10-CM

## 2018-09-14 DIAGNOSIS — F41.9 ANXIETY: Primary | ICD-10-CM

## 2018-09-14 DIAGNOSIS — F99 INSOMNIA DUE TO OTHER MENTAL DISORDER: ICD-10-CM

## 2018-09-14 DIAGNOSIS — F41.0 PANIC ATTACK: ICD-10-CM

## 2018-09-14 DIAGNOSIS — M50.90 CERVICAL NECK PAIN WITH EVIDENCE OF DISC DISEASE: ICD-10-CM

## 2018-09-14 DIAGNOSIS — M54.50 LUMBAR BACK PAIN: ICD-10-CM

## 2018-09-14 DIAGNOSIS — F51.05 INSOMNIA DUE TO OTHER MENTAL DISORDER: ICD-10-CM

## 2018-09-14 DIAGNOSIS — G89.29 CHRONIC BILATERAL THORACIC BACK PAIN: ICD-10-CM

## 2018-09-14 PROCEDURE — 1036F TOBACCO NON-USER: CPT | Performed by: NURSE PRACTITIONER

## 2018-09-14 PROCEDURE — G8427 DOCREV CUR MEDS BY ELIG CLIN: HCPCS | Performed by: NURSE PRACTITIONER

## 2018-09-14 PROCEDURE — 99213 OFFICE O/P EST LOW 20 MIN: CPT | Performed by: NURSE PRACTITIONER

## 2018-09-14 PROCEDURE — G8417 CALC BMI ABV UP PARAM F/U: HCPCS | Performed by: NURSE PRACTITIONER

## 2018-09-14 RX ORDER — CLONAZEPAM 2 MG/1
2 TABLET ORAL 2 TIMES DAILY PRN
Qty: 60 TABLET | Refills: 0 | Status: SHIPPED | OUTPATIENT
Start: 2018-09-14 | End: 2018-10-12 | Stop reason: SDUPTHER

## 2018-09-14 ASSESSMENT — ENCOUNTER SYMPTOMS
RHINORRHEA: 0
VOMITING: 0
CONSTIPATION: 0
VOICE CHANGE: 0
COUGH: 0
SHORTNESS OF BREATH: 0
DIARRHEA: 0
EYE REDNESS: 0
SORE THROAT: 0

## 2018-09-14 NOTE — PROGRESS NOTES
[address]  [place], [address]  Phone [phone]   Fax [phone]      OFFICE VISIT: 2018    Scott Bran- : 1980    Chief Complaint:  Emily Jones is a 43 y.o. female who is here for Medication Refill     HPI  The patient presents today for monthly follow-up of anxiety. She continues on Cymbalta 30 mg and Klonopin 2 mg prn. She is needing a refill on the Klonopin. She is feeling better. She has talked to her . He is thinking about coming back. Reports continued back and neck pain. She would like to see pain mgt. She went once, but never followed up because she thought she was moving states. MRI of cervical spine, 2017:  A moderate size broad-based predominantly central disc   herniation at C4-5 with moderate compression of the thecal sac. No   evidence of neural foraminal stenosis or spinal stenosis at any level. Degenerative changes of the disc C4-5. MRI of lumbar spine, 2017:  Mild degenerative disc disease at L4-5 and L5-S1 as   detailed above. This includes a small left lateral recess disc   protrusion at L5-S1, superimposed on more broad-based disc bulging. There is no significant neural foraminal narrowing or canal stenosis   and no focal disc herniation is seen. MRI of thoracic spine, 2017:  1.. Normal thoracic kyphosis with no evidence of occult fracture or   marrow edema. The thoracic cord is normal in caliber with no evidence   of expansile mass or edema. 2. Mild discogenic disease noted in the mid and lower thoracic spine   as described above. There is some slight contact with the thoracic   cord at the T7-T8 level. No other contact with or contouring of the   cord is present. There is mild foraminal stenosis inferiorly on the   left at the T9-T10 level. height is 5' 6\" (1.676 m) and weight is 200 lb 4 oz (90.8 kg). Her temporal temperature is 99.2 °F (37.3 °C). Her blood pressure is 134/82 and her pulse is 117.  Her oxygen saturation sensitive. [employer] [facility] information has been compiled for use by healthcare practitioners and consumers in the [place] and therefore [employer] does not warrant that uses outside of the [place] are appropriate, unless specifically indicated otherwise. [facility]'s drug information does not endorse drugs, diagnose patients or recommend therapy. [facility] drug information is an informational resource designed to assist licensed healthcare practitioners in caring for their patients and/or to serve consumers viewing this service as a supplement to, and not a substitute for, the expertise, skill, knowledge and judgment of healthcare practitioners. The absence of a warning for a given drug or drug combination in no way should be construed to indicate that the drug or drug combination is safe, effective or appropriate for any given patient. [facility] does not assume any responsibility for any aspect of healthcare administered with the aid of information [facility] provides. The information contained herein is not intended to cover all possible uses, directions, precautions, warnings, drug interactions, allergic reactions, or adverse effects. If you have questions about the drugs you are taking, check with your doctor, nurse or pharmacist.  Copyright 2575-1293 [address]. Version: 8.01. Revision date: 11/6/2017. Care instructions adapted under license by [facility] ([facility]). If you have questions about a medical condition or this instruction, always ask your healthcare professional. [place] [address] any warranty or liability for your use of this information. Controlled Substances Monitoring: Attestation: The Prescription Monitoring Report for this patient was reviewed today. ([id number]) [facility], APRN)  Documentation: Possible medication side effects, risk of tolerance/dependence & alternative treatments discussed., No signs of potential drug abuse or diversion identified.  [facility],

## 2018-09-14 NOTE — PATIENT INSTRUCTIONS
Patient Education          clonazepam  Pronunciation:  Michelle medina sheron  Brand:  [employer]  What is the most important information I should know about clonazepam?  You should not use this medicine if you have narrow-angle glaucoma or severe liver disease, or if you are allergic to Valium or a similar medicine. Call your doctor if you have any new or worsening symptoms of depression, unusual changes in behavior, or thoughts about suicide or hurting yourself. Clonazepam may be habit-forming. Never share clonazepam with another person. Keep the medication in a place where others cannot get to it. Selling or giving away this medicine is against the law. What is clonazepam?  Clonazepam is a benzodiazepine (omayra-alpa-dye-AZE-eh-peen) that affects chemicals in the brain that may be unbalanced. Clonazepam is also a seizure medicine, also called an anti-epileptic drug. Clonazepam is used to treat certain seizure disorders (including absence seizures or Lennox-Gastaut syndrome) in adults and children. Clonazepam is also used to treat panic disorder (including agoraphobia) in adults. Clonazepam may also be used for purposes not listed in this medication guide. What should I discuss with my healthcare provider before taking clonazepam?  You should not take clonazepam if you have:  · narrow-angle glaucoma;  · severe liver disease; or  · a history of allergic reaction to any benzodiazepine, such as diazepam (Valium), alprazolam (Xanax), lorazepam (Ativan), chlordiazepoxide, flurazepam, and others.   To make sure clonazepam is safe for you, tell your doctor if you have ever had:  · kidney or liver disease;  · glaucoma;  · asthma, emphysema, bronchitis, chronic obstructive pulmonary disorder (COPD), or other breathing problems;  · porphyria (a genetic enzyme disorder that causes symptoms affecting the skin or nervous system);  · depression or suicidal thoughts or behavior;  · mental illness, psychosis, or addiction to drugs or alcohol; or  · if you use a narcotic (opioid) medication. Some people have thoughts about suicide when taking seizure medication. Your doctor should check your progress at regular visits. Your family or other caregivers should also be alert to changes in your mood or symptoms. Follow your doctor's instructions about taking seizure medication if you are pregnant. Do not start or stop taking this medicine without your doctor's advice, and tell your doctor right away if you become pregnant. Clonazepam may cause harm to an unborn baby, and may cause breathing or feeding problems in a . But having seizures during pregnancy could harm both mother and baby. If you are pregnant, your name may be listed on a pregnancy registry. This is to track the outcome of the pregnancy and to evaluate any effects of clonazepam on the baby. Clonazepam can pass into breast milk, but effects on the nursing baby are not known. Tell your doctor if you are breast-feeding. Do not give this medicine to a child without medical advice. Clonazepam is not approved to treat panic disorder in anyone younger than 25years old. How should I take clonazepam?  Follow all directions on your prescription label. Your doctor may occasionally change your dose to make sure you get the best results. Do not use this medicine in larger or smaller amounts or for longer than recommended. Clonazepam may be habit-forming. Never share this medicine with another person, especially someone with a history of drug abuse or addiction. Keep the medication in a place where others cannot get to it. Misuse of habit-forming medicine can cause addiction, overdose, or death. Selling or giving away this medicine is against the law. Clonazepam should be used for only a short time. Do not take this medication for longer than 9 weeks without your doctor's advice. Swallow the regular clonazepam tablet whole, with a full glass of water.   To take the orally disintegrating tablet:  · Keep the tablet in its blister pack until you are ready to take it. Open the package and peel back the foil. Do not push a tablet through the foil or you may damage the tablet. · Use dry hands to remove the tablet and place it in your mouth. · Do not swallow the tablet whole. Allow it to dissolve in your mouth without chewing. · Swallow several times as the tablet dissolves. If desired, you may drink liquid to help swallow the dissolved tablet. If you use this medicine long-term, you may need frequent medical tests. Do not stop using clonazepam suddenly or you could have unpleasant withdrawal symptoms, including a seizure (convulsions). Ask your doctor how to safely stop using this medicine. Call your doctor if this medicine seems to stop working as well in treating your seizures or anxiety symptoms. Seizures are often treated with a combination of drugs. Use all medications as directed by your doctor. Read the medication guide or patient instructions provided with each medication. Do not change your doses or medication schedule without your doctor's advice. Store at room temperature away from moisture, heat, and light. Keep track of the amount of medicine used from each new bottle. Clonazepam is a drug of abuse and you should be aware if anyone is using your medicine improperly or without a prescription. What happens if I miss a dose? Take the missed dose as soon as you remember. Skip the missed dose if it is almost time for your next scheduled dose. Do not take extra medicine to make up the missed dose. What happens if I overdose? Seek emergency medical attention or call the Poison Help line at 1-281.487.5405. Overdose symptoms may include extreme drowsiness, confusion, muscle weakness, fainting, or coma. What should I avoid while taking clonazepam?  Avoid drinking alcohol. Dangerous side effects could occur. Clonazepam may impair your thinking or reactions.  Avoid driving

## 2018-10-12 ENCOUNTER — OFFICE VISIT (OUTPATIENT)
Dept: PRIMARY CARE CLINIC | Age: 38
End: 2018-10-12
Payer: COMMERCIAL

## 2018-10-12 VITALS
HEIGHT: 66 IN | SYSTOLIC BLOOD PRESSURE: 118 MMHG | HEART RATE: 80 BPM | TEMPERATURE: 97.5 F | WEIGHT: 200 LBS | DIASTOLIC BLOOD PRESSURE: 78 MMHG | BODY MASS INDEX: 32.14 KG/M2 | OXYGEN SATURATION: 98 %

## 2018-10-12 DIAGNOSIS — F99 INSOMNIA DUE TO OTHER MENTAL DISORDER: ICD-10-CM

## 2018-10-12 DIAGNOSIS — F41.0 PANIC ATTACK: ICD-10-CM

## 2018-10-12 DIAGNOSIS — F41.9 ANXIETY: ICD-10-CM

## 2018-10-12 DIAGNOSIS — F51.05 INSOMNIA DUE TO OTHER MENTAL DISORDER: ICD-10-CM

## 2018-10-12 PROCEDURE — 99213 OFFICE O/P EST LOW 20 MIN: CPT | Performed by: NURSE PRACTITIONER

## 2018-10-12 PROCEDURE — 4004F PT TOBACCO SCREEN RCVD TLK: CPT | Performed by: NURSE PRACTITIONER

## 2018-10-12 PROCEDURE — G8417 CALC BMI ABV UP PARAM F/U: HCPCS | Performed by: NURSE PRACTITIONER

## 2018-10-12 PROCEDURE — G8484 FLU IMMUNIZE NO ADMIN: HCPCS | Performed by: NURSE PRACTITIONER

## 2018-10-12 PROCEDURE — G8427 DOCREV CUR MEDS BY ELIG CLIN: HCPCS | Performed by: NURSE PRACTITIONER

## 2018-10-12 RX ORDER — AMITRIPTYLINE HYDROCHLORIDE 25 MG/1
25 TABLET, FILM COATED ORAL NIGHTLY
Qty: 30 TABLET | Refills: 3 | Status: SHIPPED | OUTPATIENT
Start: 2018-10-12 | End: 2018-11-16

## 2018-10-12 RX ORDER — CLONAZEPAM 2 MG/1
2 TABLET ORAL 2 TIMES DAILY PRN
Qty: 60 TABLET | Refills: 0 | Status: SHIPPED | OUTPATIENT
Start: 2018-10-12 | End: 2018-11-16 | Stop reason: SDUPTHER

## 2018-10-12 ASSESSMENT — ENCOUNTER SYMPTOMS
EYE REDNESS: 0
RHINORRHEA: 0
VOICE CHANGE: 0
SHORTNESS OF BREATH: 0
CONSTIPATION: 0
VOMITING: 0
SORE THROAT: 0
COUGH: 0
DIARRHEA: 0

## 2018-10-12 NOTE — PROGRESS NOTES
facility-administered medications for this visit. Allergies   Allergen Reactions    Amoxicillin        Health Maintenance   Topic Date Due    DTaP/Tdap/Td vaccine (1 - Tdap) 08/22/1998    Pneumococcal med risk (1 of 1 - PPSV23) 08/22/1998    Flu vaccine (1) 09/01/2018    Cervical cancer screen  03/13/2021    Colon cancer screen colonoscopy  11/16/2021    HIV screen  Completed        Subjective:     Review of Systems   Constitutional: Negative for chills, fatigue and fever. HENT: Negative for congestion, ear pain, rhinorrhea, sneezing, sore throat and voice change. Eyes: Negative for redness. Respiratory: Negative for cough and shortness of breath. Cardiovascular: Negative for chest pain. Gastrointestinal: Negative for constipation, diarrhea and vomiting. Skin: Negative for rash. Neurological: Negative for dizziness, numbness and headaches. Psychiatric/Behavioral: Positive for decreased concentration (ADHD managed by Dr. Estela Pepe) and sleep disturbance (improved with Klonopin). The patient is nervous/anxious (stable). Panic attacks--stable         Objective:     Physical Exam   Constitutional: She appears well-developed. HENT:   Head: Normocephalic. Right Ear: Hearing and external ear normal.   Left Ear: Hearing and external ear normal.   Nose: Nose normal.   Mouth/Throat: Oropharynx is clear and moist.   Neck: Normal range of motion. Carotid bruit is not present. Cardiovascular: Normal rate and regular rhythm. Pulmonary/Chest: Effort normal and breath sounds normal. No respiratory distress. She has no wheezes. She has no rales. Abdominal: Soft. Bowel sounds are normal.   Genitourinary: Vagina normal and uterus normal.   Musculoskeletal:        Cervical back: She exhibits tenderness and pain. Thoracic back: She exhibits tenderness and pain. Lumbar back: She exhibits tenderness and pain. Neurological: She is alert. Skin: Skin is dry.    Psychiatric:

## 2018-10-31 DIAGNOSIS — M54.12 CERVICAL RADICULOPATHY: ICD-10-CM

## 2018-10-31 DIAGNOSIS — G62.9 NEUROPATHY: ICD-10-CM

## 2018-10-31 RX ORDER — GABAPENTIN 600 MG/1
600 TABLET ORAL 3 TIMES DAILY
Qty: 90 TABLET | Refills: 1 | Status: SHIPPED | OUTPATIENT
Start: 2018-10-31 | End: 2019-02-03 | Stop reason: SDUPTHER

## 2018-11-16 ENCOUNTER — OFFICE VISIT (OUTPATIENT)
Dept: PRIMARY CARE CLINIC | Age: 38
End: 2018-11-16
Payer: COMMERCIAL

## 2018-11-16 VITALS
BODY MASS INDEX: 33.77 KG/M2 | OXYGEN SATURATION: 96 % | DIASTOLIC BLOOD PRESSURE: 76 MMHG | TEMPERATURE: 97.8 F | WEIGHT: 210.13 LBS | HEIGHT: 66 IN | SYSTOLIC BLOOD PRESSURE: 115 MMHG | HEART RATE: 98 BPM

## 2018-11-16 DIAGNOSIS — F51.05 INSOMNIA DUE TO OTHER MENTAL DISORDER: ICD-10-CM

## 2018-11-16 DIAGNOSIS — Z72.0 TOBACCO ABUSE DISORDER: ICD-10-CM

## 2018-11-16 DIAGNOSIS — F99 INSOMNIA DUE TO OTHER MENTAL DISORDER: ICD-10-CM

## 2018-11-16 DIAGNOSIS — F41.0 PANIC ATTACK: ICD-10-CM

## 2018-11-16 DIAGNOSIS — F41.9 ANXIETY: Primary | ICD-10-CM

## 2018-11-16 PROCEDURE — G8427 DOCREV CUR MEDS BY ELIG CLIN: HCPCS | Performed by: NURSE PRACTITIONER

## 2018-11-16 PROCEDURE — 4004F PT TOBACCO SCREEN RCVD TLK: CPT | Performed by: NURSE PRACTITIONER

## 2018-11-16 PROCEDURE — 99213 OFFICE O/P EST LOW 20 MIN: CPT | Performed by: NURSE PRACTITIONER

## 2018-11-16 PROCEDURE — G8484 FLU IMMUNIZE NO ADMIN: HCPCS | Performed by: NURSE PRACTITIONER

## 2018-11-16 PROCEDURE — G8417 CALC BMI ABV UP PARAM F/U: HCPCS | Performed by: NURSE PRACTITIONER

## 2018-11-16 RX ORDER — HYDROXYZINE 50 MG/1
100 TABLET, FILM COATED ORAL NIGHTLY
Qty: 60 TABLET | Refills: 3 | Status: SHIPPED | OUTPATIENT
Start: 2018-11-16 | End: 2019-02-08

## 2018-11-16 RX ORDER — CLONAZEPAM 2 MG/1
2 TABLET ORAL 2 TIMES DAILY PRN
Qty: 60 TABLET | Refills: 0 | Status: SHIPPED | OUTPATIENT
Start: 2018-11-16 | End: 2018-12-14 | Stop reason: SDUPTHER

## 2018-11-16 RX ORDER — DULOXETIN HYDROCHLORIDE 60 MG/1
60 CAPSULE, DELAYED RELEASE ORAL DAILY
Qty: 30 CAPSULE | Refills: 5 | Status: SHIPPED | OUTPATIENT
Start: 2018-11-16 | End: 2019-02-08 | Stop reason: SDUPTHER

## 2018-11-16 ASSESSMENT — ENCOUNTER SYMPTOMS
SHORTNESS OF BREATH: 0
DIARRHEA: 0
COUGH: 0
VOICE CHANGE: 0
RHINORRHEA: 0
VOMITING: 0
SORE THROAT: 0
CONSTIPATION: 0
EYE REDNESS: 0

## 2018-11-16 NOTE — PROGRESS NOTES
[facility]  Phone [phone]   Fax [phone]      OFFICE VISIT: 2018    Jas Peleltier- : 1980    Chief Tutu Lynn is a 43 y.o. female who is here for Medication Refill (Klonopin refill)     HPI  The patient presents today for follow-up of insomnia and anxiety. INSOMNIA:  She has been taking Klonopin 2 mg prn & Zyprexa 20 mg nightly. The Zyprexa helps her sleep but she has gained weight on it. She has tried Seroquel, Ambien, & Trazodone for sleep in the past.   None of these did as well as Zyprexa. She would like to try and find something to replace the Zyprexa. ANXIETY:  She is taking Klonopin 2 mg BID/prn  Her anxiety is stable on this current regimen. She is wanting to refill this medication today. height is 5' 6\" (1.676 m) and weight is 210 lb 2 oz (95.3 kg). Her temporal temperature is 97.8 °F (36.6 °C). Her blood pressure is 115/76 and her pulse is 98. Her oxygen saturation is 96%. Body mass index is 33.92 kg/m².     Results for orders placed or performed in visit on 18   Lipid Panel   Result Value Ref Range    Cholesterol, Total 134 (L) 160 - 199 mg/dL    Triglycerides 121 0 - 149 mg/dL    HDL 51 (L) 65 - 121 mg/dL    LDL Calculated 59 <100 mg/dL   CBC Auto Differential   Result Value Ref Range    WBC 6.4 4.8 - 10.8 K/uL    RBC 3.84 (L) 4.20 - 5.40 M/uL    Hemoglobin 11.2 (L) 12.0 - 16.0 g/dL    Hematocrit 36.0 (L) 37.0 - 47.0 %    MCV 93.8 81.0 - 99.0 fL    MCH 29.2 27.0 - 31.0 pg    MCHC 31.1 (L) 33.0 - 37.0 g/dL    RDW 13.9 11.5 - 14.5 %    Platelets 038 391 - 911 K/uL    MPV 11.3 9.4 - 12.3 fL    Neutrophils % 65.4 (H) 50.0 - 65.0 %    Lymphocytes % 24.3 20.0 - 40.0 %    Monocytes % 8.6 0.0 - 10.0 %    Eosinophils % 0.8 0.0 - 5.0 %    Basophils % 0.6 0.0 - 1.0 %    Neutrophils # 4.2 1.5 - 7.5 K/uL    Lymphocytes # 1.6 1.1 - 4.5 K/uL    Monocytes # 0.60 0.00 - 0.90 K/uL    Eosinophils # 0.10 0.00 - 0.60 K/uL    Basophils # 0.00 0.00 -

## 2018-12-14 ENCOUNTER — OFFICE VISIT (OUTPATIENT)
Dept: PRIMARY CARE CLINIC | Age: 38
End: 2018-12-14
Payer: COMMERCIAL

## 2018-12-14 VITALS
OXYGEN SATURATION: 98 % | BODY MASS INDEX: 33.91 KG/M2 | HEIGHT: 66 IN | WEIGHT: 211 LBS | HEART RATE: 98 BPM | TEMPERATURE: 98.5 F | DIASTOLIC BLOOD PRESSURE: 88 MMHG | SYSTOLIC BLOOD PRESSURE: 126 MMHG

## 2018-12-14 DIAGNOSIS — F41.0 PANIC ATTACK: ICD-10-CM

## 2018-12-14 DIAGNOSIS — F99 INSOMNIA DUE TO OTHER MENTAL DISORDER: Primary | ICD-10-CM

## 2018-12-14 DIAGNOSIS — F41.9 ANXIETY: ICD-10-CM

## 2018-12-14 DIAGNOSIS — F51.05 INSOMNIA DUE TO OTHER MENTAL DISORDER: Primary | ICD-10-CM

## 2018-12-14 PROCEDURE — 4004F PT TOBACCO SCREEN RCVD TLK: CPT | Performed by: NURSE PRACTITIONER

## 2018-12-14 PROCEDURE — G8427 DOCREV CUR MEDS BY ELIG CLIN: HCPCS | Performed by: NURSE PRACTITIONER

## 2018-12-14 PROCEDURE — G8417 CALC BMI ABV UP PARAM F/U: HCPCS | Performed by: NURSE PRACTITIONER

## 2018-12-14 PROCEDURE — 99213 OFFICE O/P EST LOW 20 MIN: CPT | Performed by: NURSE PRACTITIONER

## 2018-12-14 PROCEDURE — G8484 FLU IMMUNIZE NO ADMIN: HCPCS | Performed by: NURSE PRACTITIONER

## 2018-12-14 RX ORDER — CLONAZEPAM 2 MG/1
2 TABLET ORAL 2 TIMES DAILY PRN
Qty: 60 TABLET | Refills: 0 | Status: SHIPPED | OUTPATIENT
Start: 2018-12-14 | End: 2019-01-11 | Stop reason: SDUPTHER

## 2018-12-14 ASSESSMENT — ENCOUNTER SYMPTOMS
CONSTIPATION: 0
SORE THROAT: 0
DIARRHEA: 0
RHINORRHEA: 0
VOICE CHANGE: 0
COUGH: 0
SHORTNESS OF BREATH: 0
VOMITING: 0
EYE REDNESS: 0

## 2018-12-14 NOTE — PATIENT INSTRUCTIONS
disintegrating tablet:  · Keep the tablet in its blister pack until you are ready to take it. Open the package and peel back the foil. Do not push a tablet through the foil or you may damage the tablet. · Use dry hands to remove the tablet and place it in your mouth. · Do not swallow the tablet whole. Allow it to dissolve in your mouth without chewing. · Swallow several times as the tablet dissolves. If desired, you may drink liquid to help swallow the dissolved tablet. If you use this medicine long-term, you may need frequent medical tests. Do not stop using clonazepam suddenly or you could have unpleasant withdrawal symptoms, including a seizure (convulsions). Ask your doctor how to safely stop using this medicine. Call your doctor if this medicine seems to stop working as well in treating your seizures or anxiety symptoms. Seizures are often treated with a combination of drugs. Use all medications as directed by your doctor. Read the medication guide or patient instructions provided with each medication. Do not change your doses or medication schedule without your doctor's advice. Store at room temperature away from moisture, heat, and light. Keep track of the amount of medicine used from each new bottle. Clonazepam is a drug of abuse and you should be aware if anyone is using your medicine improperly or without a prescription. What happens if I miss a dose? Take the missed dose as soon as you remember. Skip the missed dose if it is almost time for your next scheduled dose. Do not take extra medicine to make up the missed dose. What happens if I overdose? Seek emergency medical attention or call the Poison Help line at 1-201.601.1164. Overdose symptoms may include extreme drowsiness, confusion, muscle weakness, fainting, or coma. What should I avoid while taking clonazepam?  Avoid drinking alcohol. Dangerous side effects could occur. Clonazepam may impair your thinking or reactions.  Avoid driving

## 2018-12-14 NOTE — PROGRESS NOTES
[facility]  Phone [phone]   Fax [phone]      OFFICE VISIT: 2018    Nenita Carolina- : 1980    Chief Jdjulyhumberto Valdovinos is a 43 y.o. female who is here for 1 Month Follow-Up (anxiety) and Medication Refill     HPI   ANXIETY:  The patient presents today for follow-up of anxiety. \"I am worried to death about my dog. \"  \"My dog has a lump under his arm. \"  \"I have only had him a year. \"  She has been taking Cymbalta 60 mg and Klonopin prn. \"I can't really tell how well it is working right now. \"   Nata Lu is so much going on. \"    INSOMNIA:  She has been taking Klonopin and Atarax nightly. She was able to sleep with both. She was able to stop the Zyprexa       height is 5' 6\" (1.676 m) and weight is 211 lb (95.7 kg). Her temporal temperature is 98.5 °F (36.9 °C). Her blood pressure is 126/88 and her pulse is 98. Her oxygen saturation is 98%. Body mass index is 34.06 kg/m².     Results for orders placed or performed in visit on 18   Lipid Panel   Result Value Ref Range    Cholesterol, Total 134 (L) 160 - 199 mg/dL    Triglycerides 121 0 - 149 mg/dL    HDL 51 (L) 65 - 121 mg/dL    LDL Calculated 59 <100 mg/dL   CBC Auto Differential   Result Value Ref Range    WBC 6.4 4.8 - 10.8 K/uL    RBC 3.84 (L) 4.20 - 5.40 M/uL    Hemoglobin 11.2 (L) 12.0 - 16.0 g/dL    Hematocrit 36.0 (L) 37.0 - 47.0 %    MCV 93.8 81.0 - 99.0 fL    MCH 29.2 27.0 - 31.0 pg    MCHC 31.1 (L) 33.0 - 37.0 g/dL    RDW 13.9 11.5 - 14.5 %    Platelets 337 195 - 285 K/uL    MPV 11.3 9.4 - 12.3 fL    Neutrophils % 65.4 (H) 50.0 - 65.0 %    Lymphocytes % 24.3 20.0 - 40.0 %    Monocytes % 8.6 0.0 - 10.0 %    Eosinophils % 0.8 0.0 - 5.0 %    Basophils % 0.6 0.0 - 1.0 %    Neutrophils # 4.2 1.5 - 7.5 K/uL    Lymphocytes # 1.6 1.1 - 4.5 K/uL    Monocytes # 0.60 0.00 - 0.90 K/uL    Eosinophils # 0.10 0.00 - 0.60 K/uL    Basophils # 0.00 0.00 - 0.20 K/uL   Comprehensive Metabolic Panel   Result Value Ref Range Sodium 143 136 - 145 mmol/L    Potassium 4.2 3.5 - 5.0 mmol/L    Chloride 102 98 - 111 mmol/L    CO2 26 22 - 29 mmol/L    Anion Gap 15 7 - 19 mmol/L    Glucose 70 (L) 74 - 109 mg/dL    BUN 12 6 - 20 mg/dL    CREATININE 0.7 0.5 - 0.9 mg/dL    GFR Non-African American >60 >60    Calcium 9.4 8.6 - 10.0 mg/dL    Total Protein 7.0 6.6 - 8.7 g/dL    Alb 4.4 3.5 - 5.2 g/dL    Total Bilirubin <0.2 0.2 - 1.2 mg/dL    Alkaline Phosphatase 56 35 - 104 U/L    ALT 8 5 - 33 U/L    AST 16 5 - 32 U/L   T4, Free   Result Value Ref Range    T4 Free 1.2 0.9 - 1.7 ng/dL   TSH without Reflex   Result Value Ref Range    TSH 0.272 0.270 - 4.200 uIU/mL   HIV Rapid 1&2   Result Value Ref Range    Rapid HIV 1&2 Non-reactive Non-reactive       I have reviewed the following with the Ms. Dillard   Lab Review   No visits with results within 6 Month(s) from this visit.    Latest known visit with results is:   Orders Only on 03/13/2018   Component Date Value    Cholesterol, Total 03/13/2018 134*    Triglycerides 03/13/2018 121     HDL 03/13/2018 51*    LDL Calculated 03/13/2018 59     WBC 03/13/2018 6.4     RBC 03/13/2018 3.84*    Hemoglobin 03/13/2018 11.2*    Hematocrit 03/13/2018 36.0*    MCV 03/13/2018 93.8     MCH 03/13/2018 29.2     MCHC 03/13/2018 31.1*    RDW 03/13/2018 13.9     Platelets 95/98/9654 295     MPV 03/13/2018 11.3     Neutrophils % 03/13/2018 65.4*    Lymphocytes % 03/13/2018 24.3     Monocytes % 03/13/2018 8.6     Eosinophils % 03/13/2018 0.8     Basophils % 03/13/2018 0.6     Neutrophils # 03/13/2018 4.2     Lymphocytes # 03/13/2018 1.6     Monocytes # 03/13/2018 0.60     Eosinophils # 03/13/2018 0.10     Basophils # 03/13/2018 0.00     Sodium 03/13/2018 143     Potassium 03/13/2018 4.2     Chloride 03/13/2018 102     CO2 03/13/2018 26     Anion Gap 03/13/2018 15     Glucose 03/13/2018 70*    BUN 03/13/2018 12     CREATININE 03/13/2018 0.7     GFR Non- 03/13/2018 >60     Calcium or  · if you use a narcotic (opioid) medication. Some people have thoughts about suicide when taking seizure medication. Your doctor should check your progress at regular visits. Your family or other caregivers should also be alert to changes in your mood or symptoms. Follow your doctor's instructions about taking seizure medication if you are pregnant. Do not start or stop taking this medicine without your doctor's advice, and tell your doctor right away if you become pregnant. Clonazepam may cause harm to an unborn baby, and may cause breathing or feeding problems in a . But having seizures during pregnancy could harm both mother and baby. If you are pregnant, your name may be listed on a pregnancy registry. This is to track the outcome of the pregnancy and to evaluate any effects of clonazepam on the baby. Clonazepam can pass into breast milk, but effects on the nursing baby are not known. Tell your doctor if you are breast-feeding. Do not give this medicine to a child without medical advice. Clonazepam is not approved to treat panic disorder in anyone younger than 25years old. How should I take clonazepam?  Follow all directions on your prescription label. Your doctor may occasionally change your dose to make sure you get the best results. Do not use this medicine in larger or smaller amounts or for longer than recommended. Clonazepam may be habit-forming. Never share this medicine with another person, especially someone with a history of drug abuse or addiction. Keep the medication in a place where others cannot get to it. Misuse of habit-forming medicine can cause addiction, overdose, or death. Selling or giving away this medicine is against the law. Clonazepam should be used for only a short time. Do not take this medication for longer than 9 weeks without your doctor's advice. Swallow the regular clonazepam tablet whole, with a full glass of water.   To take the orally disintegrating tablet:  · Keep the tablet in its blister pack until you are ready to take it. Open the package and peel back the foil. Do not push a tablet through the foil or you may damage the tablet. · Use dry hands to remove the tablet and place it in your mouth. · Do not swallow the tablet whole. Allow it to dissolve in your mouth without chewing. · Swallow several times as the tablet dissolves. If desired, you may drink liquid to help swallow the dissolved tablet. If you use this medicine long-term, you may need frequent medical tests. Do not stop using clonazepam suddenly or you could have unpleasant withdrawal symptoms, including a seizure (convulsions). Ask your doctor how to safely stop using this medicine. Call your doctor if this medicine seems to stop working as well in treating your seizures or anxiety symptoms. Seizures are often treated with a combination of drugs. Use all medications as directed by your doctor. Read the medication guide or patient instructions provided with each medication. Do not change your doses or medication schedule without your doctor's advice. Store at room temperature away from moisture, heat, and light. Keep track of the amount of medicine used from each new bottle. Clonazepam is a drug of abuse and you should be aware if anyone is using your medicine improperly or without a prescription. What happens if I miss a dose? Take the missed dose as soon as you remember. Skip the missed dose if it is almost time for your next scheduled dose. Do not take extra medicine to make up the missed dose. What happens if I overdose? Seek emergency medical attention or call the Poison Help line at 1-610.214.9395. Overdose symptoms may include extreme drowsiness, confusion, muscle weakness, fainting, or coma. What should I avoid while taking clonazepam?  Avoid drinking alcohol. Dangerous side effects could occur. Clonazepam may impair your thinking or reactions.  Avoid driving or operating machinery until you know how this medicine will affect you. Dizziness or severe drowsiness can cause falls or other accidents. What are the possible side effects of clonazepam?  Get emergency medical help if you have signs of an allergic reaction: hives; difficulty breathing; swelling of your face, lips, tongue, or throat. Report any new or worsening symptoms to your doctor, such as: mood or behavior changes, anxiety, panic attacks, trouble sleeping, or if you feel impulsive, irritable, agitated, hostile, aggressive, restless, hyperactive (mentally or physically), more depressed, or have thoughts about suicide or hurting yourself. Call your doctor at once if you have:  · new or worsening seizures;  · severe drowsiness;  · unusual changes in mood or behavior;  · confusion, aggression, hallucinations;  · thoughts of suicide or hurting yourself;  · weak or shallow breathing;  · pounding heartbeats or fluttering in your chest; or  · unusual or involuntary eye movements. The sedative effects of clonazepam may last longer in older adults. Accidental falls are common in elderly patients who take benzodiazepines. Use caution to avoid falling or accidental injury while you are taking clonazepam.  Common side effects may include:  · feeling tired or depressed;  · drowsiness, dizziness;  · memory problems; or  · problems with balance or coordination. This is not a complete list of side effects and others may occur. Call your doctor for medical advice about side effects. You may report side effects to FDA at 8-476-FDA-2992. What other drugs will affect clonazepam?  Taking clonazepam with other drugs that make you sleepy or slow your breathing can cause dangerous side effects or death. Ask your doctor before taking a sleeping pill, an opioid medicine, prescription cough medicine, a muscle relaxer, or medicine for anxiety, depression, or seizures.   Other drugs may interact with clonazepam, including prescription and

## 2018-12-17 ENCOUNTER — HOSPITAL ENCOUNTER (OUTPATIENT)
Dept: PAIN MANAGEMENT | Age: 38
Discharge: HOME | End: 2018-12-17
Payer: COMMERCIAL

## 2018-12-17 VITALS
DIASTOLIC BLOOD PRESSURE: 72 MMHG | TEMPERATURE: 99.2 F | HEART RATE: 98 BPM | SYSTOLIC BLOOD PRESSURE: 106 MMHG | HEIGHT: 66 IN | OXYGEN SATURATION: 98 % | BODY MASS INDEX: 32.14 KG/M2 | RESPIRATION RATE: 18 BRPM | WEIGHT: 200 LBS

## 2018-12-17 DIAGNOSIS — M79.2 NEUROPATHIC PAIN: ICD-10-CM

## 2018-12-17 DIAGNOSIS — M54.9 MID BACK PAIN: ICD-10-CM

## 2018-12-17 DIAGNOSIS — M54.6 CHRONIC BILATERAL THORACIC BACK PAIN: ICD-10-CM

## 2018-12-17 DIAGNOSIS — M53.3 SACROILIAC JOINT DYSFUNCTION OF LEFT SIDE: ICD-10-CM

## 2018-12-17 DIAGNOSIS — M54.2 CERVICALGIA: ICD-10-CM

## 2018-12-17 DIAGNOSIS — G89.29 CHRONIC BILATERAL THORACIC BACK PAIN: ICD-10-CM

## 2018-12-17 DIAGNOSIS — M62.830 MUSCLE SPASM OF BACK: ICD-10-CM

## 2018-12-17 DIAGNOSIS — M54.16 LUMBAR RADICULOPATHY: ICD-10-CM

## 2018-12-17 PROCEDURE — 99214 OFFICE O/P EST MOD 30 MIN: CPT

## 2018-12-17 PROCEDURE — 99204 OFFICE O/P NEW MOD 45 MIN: CPT

## 2018-12-17 PROCEDURE — 99214 OFFICE O/P EST MOD 30 MIN: CPT | Performed by: NURSE PRACTITIONER

## 2018-12-17 RX ORDER — GLUCOSAMINE/D3/BOSWELLIA SERRA 1500MG-400
1 TABLET ORAL DAILY
COMMUNITY
End: 2019-01-17 | Stop reason: ALTCHOICE

## 2018-12-17 RX ORDER — EMOLLIENT BASE
CREAM (GRAM) TOPICAL
Qty: 300 G | Refills: 5 | Status: SHIPPED | OUTPATIENT
Start: 2018-12-17 | End: 2019-11-21

## 2018-12-17 RX ORDER — TIZANIDINE 4 MG/1
TABLET ORAL
Qty: 60 TABLET | Refills: 0 | Status: SHIPPED | OUTPATIENT
Start: 2018-12-17 | End: 2019-05-17 | Stop reason: ALTCHOICE

## 2018-12-17 ASSESSMENT — PAIN DESCRIPTION - ONSET: ONSET: ON-GOING

## 2018-12-17 ASSESSMENT — PAIN DESCRIPTION - ORIENTATION: ORIENTATION: MID;LOWER

## 2018-12-17 ASSESSMENT — PAIN DESCRIPTION - LOCATION: LOCATION: BACK

## 2018-12-17 ASSESSMENT — PAIN DESCRIPTION - PAIN TYPE: TYPE: CHRONIC PAIN

## 2018-12-17 ASSESSMENT — PAIN DESCRIPTION - PROGRESSION: CLINICAL_PROGRESSION: NOT CHANGED

## 2018-12-17 ASSESSMENT — PAIN DESCRIPTION - FREQUENCY: FREQUENCY: CONTINUOUS

## 2018-12-17 ASSESSMENT — PAIN SCALES - GENERAL: PAINLEVEL_OUTOF10: 7

## 2018-12-17 ASSESSMENT — PAIN DESCRIPTION - DESCRIPTORS: DESCRIPTORS: SHARP;STABBING

## 2018-12-17 ASSESSMENT — ACTIVITIES OF DAILY LIVING (ADL): EFFECT OF PAIN ON DAILY ACTIVITIES: LIMITS DAILY ACTIVITIES

## 2018-12-17 ASSESSMENT — PAIN DESCRIPTION - DIRECTION: RADIATING_TOWARDS: RADIATES DOWN LEFT LEG

## 2019-01-11 ENCOUNTER — OFFICE VISIT (OUTPATIENT)
Dept: PRIMARY CARE CLINIC | Age: 39
End: 2019-01-11
Payer: COMMERCIAL

## 2019-01-11 VITALS
TEMPERATURE: 98.2 F | HEART RATE: 89 BPM | BODY MASS INDEX: 33.75 KG/M2 | HEIGHT: 66 IN | DIASTOLIC BLOOD PRESSURE: 77 MMHG | OXYGEN SATURATION: 98 % | SYSTOLIC BLOOD PRESSURE: 119 MMHG | WEIGHT: 210 LBS

## 2019-01-11 DIAGNOSIS — F41.0 PANIC ATTACK: ICD-10-CM

## 2019-01-11 DIAGNOSIS — F51.05 INSOMNIA DUE TO OTHER MENTAL DISORDER: ICD-10-CM

## 2019-01-11 DIAGNOSIS — F41.9 ANXIETY: Primary | ICD-10-CM

## 2019-01-11 DIAGNOSIS — F99 INSOMNIA DUE TO OTHER MENTAL DISORDER: ICD-10-CM

## 2019-01-11 PROCEDURE — 99213 OFFICE O/P EST LOW 20 MIN: CPT | Performed by: NURSE PRACTITIONER

## 2019-01-11 PROCEDURE — 4004F PT TOBACCO SCREEN RCVD TLK: CPT | Performed by: NURSE PRACTITIONER

## 2019-01-11 PROCEDURE — G8417 CALC BMI ABV UP PARAM F/U: HCPCS | Performed by: NURSE PRACTITIONER

## 2019-01-11 PROCEDURE — G8484 FLU IMMUNIZE NO ADMIN: HCPCS | Performed by: NURSE PRACTITIONER

## 2019-01-11 PROCEDURE — G8427 DOCREV CUR MEDS BY ELIG CLIN: HCPCS | Performed by: NURSE PRACTITIONER

## 2019-01-11 RX ORDER — CLONAZEPAM 2 MG/1
2 TABLET ORAL 2 TIMES DAILY PRN
Qty: 60 TABLET | Refills: 0 | Status: SHIPPED | OUTPATIENT
Start: 2019-01-11 | End: 2019-02-08 | Stop reason: SDUPTHER

## 2019-01-11 ASSESSMENT — ENCOUNTER SYMPTOMS
COUGH: 0
VOMITING: 0
VOICE CHANGE: 0
SHORTNESS OF BREATH: 0
SORE THROAT: 0
RHINORRHEA: 0
CONSTIPATION: 0
DIARRHEA: 0
EYE REDNESS: 0

## 2019-01-17 ENCOUNTER — HOSPITAL ENCOUNTER (OUTPATIENT)
Dept: PAIN MANAGEMENT | Age: 39
Discharge: HOME | End: 2019-01-17
Payer: COMMERCIAL

## 2019-01-17 VITALS
DIASTOLIC BLOOD PRESSURE: 75 MMHG | OXYGEN SATURATION: 99 % | RESPIRATION RATE: 20 BRPM | BODY MASS INDEX: 34.39 KG/M2 | SYSTOLIC BLOOD PRESSURE: 132 MMHG | HEART RATE: 90 BPM | TEMPERATURE: 98 F | WEIGHT: 214 LBS | HEIGHT: 66 IN

## 2019-01-17 DIAGNOSIS — M62.830 MUSCLE SPASM OF BACK: ICD-10-CM

## 2019-01-17 DIAGNOSIS — M54.9 MID BACK PAIN: ICD-10-CM

## 2019-01-17 DIAGNOSIS — M53.3 SACROILIAC JOINT DYSFUNCTION OF LEFT SIDE: ICD-10-CM

## 2019-01-17 DIAGNOSIS — M54.2 CERVICALGIA: ICD-10-CM

## 2019-01-17 DIAGNOSIS — M79.2 NEUROPATHIC PAIN: ICD-10-CM

## 2019-01-17 LAB — SUMMARY COMPLIANCE DRUG ANAL, UR: NORMAL ML

## 2019-01-17 PROCEDURE — 20553 NJX 1/MLT TRIGGER POINTS 3/>: CPT

## 2019-01-17 PROCEDURE — 2500000003 HC RX 250 WO HCPCS: Performed by: NURSE PRACTITIONER

## 2019-01-17 PROCEDURE — 20553 NJX 1/MLT TRIGGER POINTS 3/>: CPT | Performed by: NURSE PRACTITIONER

## 2019-01-17 PROCEDURE — 80307 DRUG TEST PRSMV CHEM ANLYZR: CPT

## 2019-01-17 RX ORDER — BUPIVACAINE HYDROCHLORIDE 5 MG/ML
INJECTION, SOLUTION EPIDURAL; INTRACAUDAL
Status: COMPLETED | OUTPATIENT
Start: 2019-01-17 | End: 2019-01-17

## 2019-01-17 RX ORDER — LIDOCAINE HYDROCHLORIDE 10 MG/ML
INJECTION, SOLUTION EPIDURAL; INFILTRATION; INTRACAUDAL; PERINEURAL
Status: COMPLETED | OUTPATIENT
Start: 2019-01-17 | End: 2019-01-17

## 2019-01-17 RX ADMIN — BUPIVACAINE HYDROCHLORIDE 10 ML: 5 INJECTION, SOLUTION EPIDURAL; INTRACAUDAL; PERINEURAL at 15:44

## 2019-01-17 RX ADMIN — LIDOCAINE HYDROCHLORIDE 10 ML: 10 INJECTION, SOLUTION EPIDURAL; INFILTRATION; INTRACAUDAL; PERINEURAL at 15:45

## 2019-01-17 ASSESSMENT — PAIN DESCRIPTION - LOCATION: LOCATION: BACK

## 2019-01-17 ASSESSMENT — PAIN SCALES - GENERAL: PAINLEVEL_OUTOF10: 6

## 2019-01-17 ASSESSMENT — PAIN DESCRIPTION - ORIENTATION: ORIENTATION: MID

## 2019-01-17 ASSESSMENT — PAIN DESCRIPTION - PAIN TYPE: TYPE: CHRONIC PAIN

## 2019-01-17 ASSESSMENT — PAIN DESCRIPTION - DESCRIPTORS: DESCRIPTORS: STABBING;CONSTANT

## 2019-01-17 ASSESSMENT — PAIN DESCRIPTION - DIRECTION: RADIATING_TOWARDS: DOWN BILATERAL LEGS

## 2019-01-17 ASSESSMENT — PAIN DESCRIPTION - PROGRESSION: CLINICAL_PROGRESSION: NOT CHANGED

## 2019-01-17 ASSESSMENT — PAIN DESCRIPTION - FREQUENCY: FREQUENCY: CONTINUOUS

## 2019-01-17 ASSESSMENT — ACTIVITIES OF DAILY LIVING (ADL): EFFECT OF PAIN ON DAILY ACTIVITIES: LIMITS ACTIVITIES

## 2019-01-17 ASSESSMENT — PAIN DESCRIPTION - ONSET: ONSET: ON-GOING

## 2019-02-03 DIAGNOSIS — G62.9 NEUROPATHY: ICD-10-CM

## 2019-02-03 DIAGNOSIS — M54.12 CERVICAL RADICULOPATHY: ICD-10-CM

## 2019-02-05 RX ORDER — GABAPENTIN 600 MG/1
600 TABLET ORAL 3 TIMES DAILY
Qty: 90 TABLET | Refills: 1 | Status: SHIPPED | OUTPATIENT
Start: 2019-02-05 | End: 2019-05-17 | Stop reason: SDUPTHER

## 2019-02-08 ENCOUNTER — OFFICE VISIT (OUTPATIENT)
Dept: PRIMARY CARE CLINIC | Age: 39
End: 2019-02-08
Payer: COMMERCIAL

## 2019-02-08 VITALS
TEMPERATURE: 98.3 F | DIASTOLIC BLOOD PRESSURE: 83 MMHG | HEART RATE: 116 BPM | HEIGHT: 66 IN | WEIGHT: 218 LBS | SYSTOLIC BLOOD PRESSURE: 134 MMHG | BODY MASS INDEX: 35.03 KG/M2 | OXYGEN SATURATION: 98 %

## 2019-02-08 DIAGNOSIS — F41.9 ANXIETY: ICD-10-CM

## 2019-02-08 DIAGNOSIS — Z13.31 POSITIVE DEPRESSION SCREENING: ICD-10-CM

## 2019-02-08 DIAGNOSIS — F99 INSOMNIA DUE TO OTHER MENTAL DISORDER: ICD-10-CM

## 2019-02-08 DIAGNOSIS — Z79.899 MEDICATION MANAGEMENT: ICD-10-CM

## 2019-02-08 DIAGNOSIS — F41.0 PANIC ATTACK: Primary | ICD-10-CM

## 2019-02-08 DIAGNOSIS — F51.05 INSOMNIA DUE TO OTHER MENTAL DISORDER: ICD-10-CM

## 2019-02-08 LAB
AMPHETAMINE SCREEN, URINE: NORMAL
BARBITURATE SCREEN, URINE: NORMAL
BENZODIAZEPINE SCREEN, URINE: NORMAL
BUPRENORPHINE URINE: NORMAL
COCAINE METABOLITE SCREEN URINE: NORMAL
GABAPENTIN SCREEN, URINE: NORMAL
MDMA URINE: NORMAL
METHADONE SCREEN, URINE: NORMAL
METHAMPHETAMINE, URINE: NORMAL
OPIATE SCREEN URINE: NORMAL
OXYCODONE SCREEN URINE: NORMAL
PHENCYCLIDINE SCREEN URINE: NORMAL
PROPOXYPHENE SCREEN, URINE: NORMAL
THC SCREEN, URINE: NORMAL
TRICYCLIC ANTIDEPRESSANTS, UR: NORMAL

## 2019-02-08 PROCEDURE — G8484 FLU IMMUNIZE NO ADMIN: HCPCS | Performed by: NURSE PRACTITIONER

## 2019-02-08 PROCEDURE — 80305 DRUG TEST PRSMV DIR OPT OBS: CPT | Performed by: NURSE PRACTITIONER

## 2019-02-08 PROCEDURE — 4004F PT TOBACCO SCREEN RCVD TLK: CPT | Performed by: NURSE PRACTITIONER

## 2019-02-08 PROCEDURE — 99213 OFFICE O/P EST LOW 20 MIN: CPT | Performed by: NURSE PRACTITIONER

## 2019-02-08 PROCEDURE — G8417 CALC BMI ABV UP PARAM F/U: HCPCS | Performed by: NURSE PRACTITIONER

## 2019-02-08 PROCEDURE — G8427 DOCREV CUR MEDS BY ELIG CLIN: HCPCS | Performed by: NURSE PRACTITIONER

## 2019-02-08 PROCEDURE — G8431 POS CLIN DEPRES SCRN F/U DOC: HCPCS | Performed by: NURSE PRACTITIONER

## 2019-02-08 PROCEDURE — 96160 PT-FOCUSED HLTH RISK ASSMT: CPT | Performed by: NURSE PRACTITIONER

## 2019-02-08 RX ORDER — DULOXETIN HYDROCHLORIDE 60 MG/1
60 CAPSULE, DELAYED RELEASE ORAL DAILY
Qty: 30 CAPSULE | Refills: 5 | Status: SHIPPED | OUTPATIENT
Start: 2019-02-08 | End: 2019-12-12 | Stop reason: SDUPTHER

## 2019-02-08 RX ORDER — CLONAZEPAM 2 MG/1
2 TABLET ORAL 2 TIMES DAILY PRN
Qty: 60 TABLET | Refills: 0 | Status: SHIPPED | OUTPATIENT
Start: 2019-02-08 | End: 2019-03-08 | Stop reason: SDUPTHER

## 2019-02-08 RX ORDER — TRAZODONE HYDROCHLORIDE 50 MG/1
50 TABLET ORAL NIGHTLY
Qty: 60 TABLET | Refills: 1 | Status: SHIPPED | OUTPATIENT
Start: 2019-02-08 | End: 2019-04-27 | Stop reason: SDUPTHER

## 2019-02-08 ASSESSMENT — PATIENT HEALTH QUESTIONNAIRE - PHQ9
SUM OF ALL RESPONSES TO PHQ QUESTIONS 1-9: 15
1. LITTLE INTEREST OR PLEASURE IN DOING THINGS: 2
SUM OF ALL RESPONSES TO PHQ QUESTIONS 1-9: 15
2. FEELING DOWN, DEPRESSED OR HOPELESS: 3
4. FEELING TIRED OR HAVING LITTLE ENERGY: 3
7. TROUBLE CONCENTRATING ON THINGS, SUCH AS READING THE NEWSPAPER OR WATCHING TELEVISION: 2
6. FEELING BAD ABOUT YOURSELF - OR THAT YOU ARE A FAILURE OR HAVE LET YOURSELF OR YOUR FAMILY DOWN: 1
10. IF YOU CHECKED OFF ANY PROBLEMS, HOW DIFFICULT HAVE THESE PROBLEMS MADE IT FOR YOU TO DO YOUR WORK, TAKE CARE OF THINGS AT HOME, OR GET ALONG WITH OTHER PEOPLE: 0
5. POOR APPETITE OR OVEREATING: 2
SUM OF ALL RESPONSES TO PHQ9 QUESTIONS 1 & 2: 5
3. TROUBLE FALLING OR STAYING ASLEEP: 2
8. MOVING OR SPEAKING SO SLOWLY THAT OTHER PEOPLE COULD HAVE NOTICED. OR THE OPPOSITE, BEING SO FIGETY OR RESTLESS THAT YOU HAVE BEEN MOVING AROUND A LOT MORE THAN USUAL: 0
9. THOUGHTS THAT YOU WOULD BE BETTER OFF DEAD, OR OF HURTING YOURSELF: 0

## 2019-02-08 ASSESSMENT — ENCOUNTER SYMPTOMS
COUGH: 0
CONSTIPATION: 0
VOMITING: 0
RHINORRHEA: 0
DIARRHEA: 0
EYE REDNESS: 0
SHORTNESS OF BREATH: 0
SORE THROAT: 0
VOICE CHANGE: 0

## 2019-03-05 DIAGNOSIS — F41.9 ANXIETY: ICD-10-CM

## 2019-03-05 RX ORDER — DULOXETIN HYDROCHLORIDE 30 MG/1
CAPSULE, DELAYED RELEASE ORAL
Qty: 30 CAPSULE | Refills: 6 | OUTPATIENT
Start: 2019-03-05

## 2019-03-08 ENCOUNTER — OFFICE VISIT (OUTPATIENT)
Dept: PRIMARY CARE CLINIC | Age: 39
End: 2019-03-08
Payer: COMMERCIAL

## 2019-03-08 VITALS
OXYGEN SATURATION: 98 % | BODY MASS INDEX: 34.43 KG/M2 | WEIGHT: 214.25 LBS | HEIGHT: 66 IN | SYSTOLIC BLOOD PRESSURE: 128 MMHG | DIASTOLIC BLOOD PRESSURE: 81 MMHG | TEMPERATURE: 99.3 F | HEART RATE: 107 BPM

## 2019-03-08 DIAGNOSIS — F99 INSOMNIA DUE TO OTHER MENTAL DISORDER: ICD-10-CM

## 2019-03-08 DIAGNOSIS — F41.9 ANXIETY: Primary | ICD-10-CM

## 2019-03-08 DIAGNOSIS — F41.0 PANIC ATTACK: ICD-10-CM

## 2019-03-08 DIAGNOSIS — F51.05 INSOMNIA DUE TO OTHER MENTAL DISORDER: ICD-10-CM

## 2019-03-08 PROCEDURE — G8427 DOCREV CUR MEDS BY ELIG CLIN: HCPCS | Performed by: NURSE PRACTITIONER

## 2019-03-08 PROCEDURE — 99213 OFFICE O/P EST LOW 20 MIN: CPT | Performed by: NURSE PRACTITIONER

## 2019-03-08 PROCEDURE — G8484 FLU IMMUNIZE NO ADMIN: HCPCS | Performed by: NURSE PRACTITIONER

## 2019-03-08 PROCEDURE — 4004F PT TOBACCO SCREEN RCVD TLK: CPT | Performed by: NURSE PRACTITIONER

## 2019-03-08 PROCEDURE — G8417 CALC BMI ABV UP PARAM F/U: HCPCS | Performed by: NURSE PRACTITIONER

## 2019-03-08 RX ORDER — CLONAZEPAM 2 MG/1
2 TABLET ORAL 2 TIMES DAILY PRN
Qty: 60 TABLET | Refills: 0 | Status: SHIPPED | OUTPATIENT
Start: 2019-03-08 | End: 2019-04-18 | Stop reason: SDUPTHER

## 2019-03-08 RX ORDER — QUETIAPINE FUMARATE 25 MG/1
25 TABLET, FILM COATED ORAL NIGHTLY
Qty: 30 TABLET | Refills: 3 | Status: SHIPPED | OUTPATIENT
Start: 2019-03-08 | End: 2019-04-18 | Stop reason: SDUPTHER

## 2019-03-08 ASSESSMENT — ENCOUNTER SYMPTOMS
RHINORRHEA: 0
VOMITING: 0
COUGH: 0
SHORTNESS OF BREATH: 0
DIARRHEA: 0
VOICE CHANGE: 0
EYE REDNESS: 0
CONSTIPATION: 0
SORE THROAT: 0

## 2019-04-18 ENCOUNTER — OFFICE VISIT (OUTPATIENT)
Dept: PRIMARY CARE CLINIC | Age: 39
End: 2019-04-18
Payer: COMMERCIAL

## 2019-04-18 ENCOUNTER — HOSPITAL ENCOUNTER (OUTPATIENT)
Dept: GENERAL RADIOLOGY | Age: 39
Discharge: HOME | End: 2019-04-18
Payer: COMMERCIAL

## 2019-04-18 ENCOUNTER — TELEPHONE (OUTPATIENT)
Dept: PRIMARY CARE CLINIC | Age: 39
End: 2019-04-18

## 2019-04-18 VITALS
HEIGHT: 66 IN | BODY MASS INDEX: 33.97 KG/M2 | WEIGHT: 211.38 LBS | TEMPERATURE: 99 F | OXYGEN SATURATION: 97 % | SYSTOLIC BLOOD PRESSURE: 119 MMHG | HEART RATE: 103 BPM | DIASTOLIC BLOOD PRESSURE: 83 MMHG

## 2019-04-18 DIAGNOSIS — F41.0 PANIC ATTACK: ICD-10-CM

## 2019-04-18 DIAGNOSIS — Z00.00 ENCOUNTER FOR PREVENTIVE HEALTH EXAMINATION: Primary | ICD-10-CM

## 2019-04-18 DIAGNOSIS — M79.671 RIGHT FOOT PAIN: ICD-10-CM

## 2019-04-18 DIAGNOSIS — F41.9 ANXIETY: ICD-10-CM

## 2019-04-18 DIAGNOSIS — F99 INSOMNIA DUE TO OTHER MENTAL DISORDER: ICD-10-CM

## 2019-04-18 DIAGNOSIS — F51.05 INSOMNIA DUE TO OTHER MENTAL DISORDER: ICD-10-CM

## 2019-04-18 PROCEDURE — 99395 PREV VISIT EST AGE 18-39: CPT | Performed by: NURSE PRACTITIONER

## 2019-04-18 PROCEDURE — 73630 X-RAY EXAM OF FOOT: CPT

## 2019-04-18 RX ORDER — QUETIAPINE FUMARATE 100 MG/1
100 TABLET, FILM COATED ORAL NIGHTLY
Qty: 30 TABLET | Refills: 5 | Status: SHIPPED | OUTPATIENT
Start: 2019-04-18 | End: 2019-08-15 | Stop reason: SDUPTHER

## 2019-04-18 RX ORDER — CLONAZEPAM 2 MG/1
2 TABLET ORAL 2 TIMES DAILY PRN
Qty: 60 TABLET | Refills: 0 | Status: SHIPPED | OUTPATIENT
Start: 2019-04-18 | End: 2019-05-17 | Stop reason: SDUPTHER

## 2019-04-18 ASSESSMENT — ENCOUNTER SYMPTOMS
EYE REDNESS: 0
VOMITING: 0
VOICE CHANGE: 0
COUGH: 0
DIARRHEA: 0
SORE THROAT: 0
RHINORRHEA: 0
CONSTIPATION: 0
SHORTNESS OF BREATH: 0

## 2019-04-18 NOTE — PROGRESS NOTES
Jdlicha Isaias Polanco [facility]  Phone [phone]   Fax [phone]      OFFICE VISIT: 2019    Ori Farris- : 1980    Chief Naldo Bruno is a 43 y.o. female who is here for Follow-up (anxiety) and Medication Refill     HPI   The patient presents today for physical and follow-up of insomnia. She has been taking Seroquel 25 mg (4-5 tablets nightly). As long as she takes 4-5 tablets a night she can sleep. She reports right foot swelling. This happened a few weeks ago when she walking. She was seen at urgent care. They had her wrap the foot with ACE bandage. Reports continued right foot pain. Eyes:  Not recently, denies any visual changes  Dentist:  Last  year  Skin:  No  SBE:  No  Mammo:  Denies family history of breast cancer. Pap:  2018  Menses: Regular  Colonoscopy:  n/a  Dexa Scan:  n/a  Calcium & Vit. D:  No  MVI:  Yes  Supplements:  No  Asa:  No  Labs:  Due  Exercise:  Not regular  Body Image: Overweight  Diet and Nutr: No special diets   Depression:  \"ok\"  She is taking Cymbalta 60 mg daily  She takes Klonopin prn for anxiety and insomnia. She is currently out of this medication. This medication helps keep her functional  Fall:  No  EOL:  No  Tobacco:  1 ppd  Substance:  Rarely socially  Sleep: Better with Seroquel  Cognition: No concerns     height is 5' 6\" (1.676 m) and weight is 211 lb 6 oz (95.9 kg). Her temporal temperature is 99 °F (37.2 °C). Her blood pressure is 119/83 and her pulse is 103. Her oxygen saturation is 97%. Body mass index is 34.12 kg/m².     Results for orders placed or performed in visit on 19   POCT Rapid Drug Screen   Result Value Ref Range    Amphetamine Screen, Urine neg     Barbiturate Screen, Urine neg     Benzodiazepine Screen, Urine neg     Buprenorphine Urine neg     Cocaine Metabolite Screen, Urine neg     Gabapentin Screen, Urine neg     MDMA, Urine neg     Methamphetamine, Urine neg     Methadone Screen, Urine neg     Opiate Scrn, Ur neg     Oxycodone Screen, Ur neg     PCP Screen, Urine neg     Propoxyphene Screen, Urine neg     THC Screen, Urine neg     Tricyclic Antidepressants, Urine neg      have reviewed the following with the Ms. Mumtaz Arthur   Lab Review   Office Visit on 02/08/2019   Component Date Value    Amphetamine Screen, Urine 02/08/2019 neg     Barbiturate Screen, Urine 02/08/2019 neg     Benzodiazepine Screen, U* 02/08/2019 neg     Buprenorphine Urine 02/08/2019 neg     Cocaine Metabolite Scree* 02/08/2019 neg     Gabapentin Screen, Urine 02/08/2019 neg     MDMA, Urine 02/08/2019 neg     Methamphetamine, Urine 02/08/2019 neg     Methadone Screen, Urine 02/08/2019 neg     Opiate Scrn, Ur 02/08/2019 neg     Oxycodone Screen, Ur 02/08/2019 neg     PCP Screen, Urine 02/08/2019 neg     Propoxyphene Screen, Uri* 02/08/2019 neg     THC Screen, Urine 13/39/2143 neg     Tricyclic Antidepressant* 49/00/8811 neg    Hospital Outpatient Visit on 01/17/2019   Component Date Value    Summary Compliance Drug * 01/17/2019 SEE SCAN DOC      Copies of these are in the chart. Prior to Visit Medications    Medication Sig Taking? Authorizing Provider   clonazePAM (KLONOPIN) 2 MG tablet Take 1 tablet by mouth 2 times daily as needed (anxiety) for up to 30 days. Yes RYAN Peters   QUEtiapine (SEROQUEL) 100 MG tablet Take 1 tablet by mouth nightly Yes RYAN Peters   traZODone (DESYREL) 50 MG tablet Take 1 tablet by mouth nightly May take 1-2 tablets at bedtime Yes RYAN Peters   DULoxetine (CYMBALTA) 60 MG extended release capsule Take 1 capsule by mouth daily Yes RYAN Peters   gabapentin (NEURONTIN) 600 MG tablet Take 1 tablet by mouth 3 times daily for 180 days. .  Patient taking differently: Take 600 mg by mouth 2 times daily.   Yes RYAN Peters   tiZANidine (ZANAFLEX) 4 MG tablet 1/4 tablet with meals 1/2 to one tablet at bedtime Yes Tarah RYAN Cosby   [employer] CREA [facility] Pain Cream #4 Add Gabapentin 6% Apply 1-2 pumps to affected area 3-4 times a day Yes Tarah G RYAN Long   simvastatin (ZOCOR) 20 MG tablet Take 1 tablet by mouth every evening Yes RYAN Madrid   amphetamine-dextroamphetamine (ADDERALL) 10 MG tablet  Yes Historical Provider, MD   amphetamine-dextroamphetamine (ADDERALL XR) 30 MG extended release capsule  Yes Historical Provider, MD       Allergies: Amoxicillin    Past Medical History:   Diagnosis Date    ADHD (attention deficit hyperactivity disorder)     Anxiety     Arthritis     Chronic back pain     Lichen sclerosus     PCOS (polycystic ovarian syndrome)        Past Surgical History:   Procedure Laterality Date    COLONOSCOPY  11/16/2016    Dr Shruthi Bennett [facility])-normal, thick stool in right colon, 5-10 yr recall    UTERINE SUSPENSION  2001       Social History     Tobacco Use    Smoking status: Current Every Day Smoker     Packs/day: 2.00     Types: Cigarettes    Smokeless tobacco: Never Used   Substance Use Topics    Alcohol use: Yes     Alcohol/week: 0.0 oz     No family history on file. Review of Systems   Constitutional: Negative for chills, fatigue and fever. HENT: Negative for congestion, ear pain, rhinorrhea, sneezing, sore throat and voice change. Eyes: Negative for redness. Respiratory: Negative for cough and shortness of breath. Cardiovascular: Negative for chest pain. Gastrointestinal: Negative for constipation, diarrhea and vomiting. Skin: Negative for rash. Neurological: Negative for dizziness, numbness and headaches. Psychiatric/Behavioral: Positive for decreased concentration (ADHD managed by Dr. Buddy Johnson) and sleep disturbance (improved with Seroquel). The patient is nervous/anxious (stable). Panic attacks--stable       Physical Exam   Constitutional: She appears well-developed. Overweight   HENT:   Head: Normocephalic.    Right Ear: Hearing, tympanic membrane and external ear normal.   Left Ear: Hearing, tympanic membrane and external ear normal.   Nose: Nose normal. No mucosal edema or rhinorrhea. Right sinus exhibits no maxillary sinus tenderness and no frontal sinus tenderness. Left sinus exhibits no maxillary sinus tenderness and no frontal sinus tenderness. Mouth/Throat: Oropharynx is clear and moist.   Neck: Normal range of motion. Carotid bruit is not present. Cardiovascular: Normal rate and regular rhythm. Pulmonary/Chest: Effort normal. No respiratory distress. She has no wheezes. She has rhonchi (scattered). She has no rales. Abdominal: Soft. Bowel sounds are normal.   Genitourinary: Vagina normal and uterus normal.   Musculoskeletal:        Cervical back: She exhibits tenderness and pain. Lumbar back: She exhibits tenderness and pain. Feet:    Neurological: She is alert. Skin: Skin is dry. Psychiatric: Her speech is normal and behavior is normal. Thought content normal. Her mood appears anxious. Vitals reviewed. ASSESSMENT      ICD-10-CM    1. Encounter for preventive health examination Z00.00 CBC Auto Differential     Comprehensive Metabolic Panel     TSH without Reflex     T4, Free     Lipid Panel     Microalbumin / Creatinine Urine Ratio     Hemoglobin A1C   2. Anxiety F41.9 clonazePAM (KLONOPIN) 2 MG tablet   3. Panic attack F41.0 clonazePAM (KLONOPIN) 2 MG tablet   4. Insomnia due to other mental disorder F51.05 clonazePAM (KLONOPIN) 2 MG tablet    F99 QUEtiapine (SEROQUEL) 100 MG tablet (increased from 25 mg)   5. Right foot pain M79.671 XR FOOT RIGHT (2 VIEWS)         PLAN    Orders Placed This Encounter   Procedures    XR FOOT RIGHT (2 VIEWS)    CBC Auto Differential    Comprehensive Metabolic Panel    TSH without Reflex    T4, Free    Lipid Panel    Microalbumin / Creatinine Urine Ratio    Hemoglobin A1C        Return in about 1 month (around 5/18/2019), or if symptoms worsen or fail to improve.      Patient Instructions     Patient Education quetiapine  Pronunciation:  carlyle buenrostro  Brand:  SEROquel, SEROquel XR  What is the most important information I should know about quetiapine? Some young people have thoughts about suicide when first taking quetiapine. Stay alert to changes in your mood or symptoms. Report any new or worsening symptoms to your doctor. Quetiapine is not approved for use in psychotic conditions related to dementia. Quetiapine may increase the risk of death in older adults with dementia-related conditions. What is quetiapine? Quetiapine is an antipsychotic medicine. It works by changing the actions of chemicals in the brain. Quetiapine is used to treat schizophrenia in adults and children who are at least 15years old. Quetiapine is used to treat bipolar disorder (manic depression) in adults and children who are at least 8years old. Quetiapine is also used together with antidepressant medications to treat major depressive disorder in adults. Extended-release quetiapine (Seroquel XR) is for use only in adults and should not be given to anyone younger than 25years old. Quetiapine may also be used for purposes not listed in this medication guide. What should I discuss with my healthcare provider before taking quetiapine? You should not use quetiapine if you are allergic to it. Quetiapine is not approved for use in psychotic conditions related to dementia. Quetiapine may increase the risk of death in older adults with dementia-related conditions. To make sure quetiapine is safe for you, tell your doctor if you have:  · liver or kidney disease;  · heart disease, heart rhythm problems, a history of heart attack or stroke;  · high or low blood pressure;  · a history of low white blood cell (WBC) counts;  · abnormal thyroid tests or prolactin levels;  · seizures or epilepsy;  · cataracts;  · high cholesterol or triglycerides;  · a personal or family history of diabetes; or  · trouble swallowing.   Some young people have thoughts about suicide when first taking quetiapine. Your doctor will need to check your progress at regular visits. Your family or other caregivers should also be alert to changes in your mood or symptoms. Taking antipsychotic medication during the last 3 months of pregnancy may cause problems in the , such as withdrawal symptoms, breathing problems, feeding problems, fussiness, tremors, and limp or stiff muscles. However, you may have withdrawal symptoms or other problems if you stop taking your medicine during pregnancy. If you become pregnant while taking quetiapine, do not stop taking it without your doctor's advice. Quetiapine can pass into breast milk and may harm a nursing baby. You should not breast-feed while you are using quetiapine. Do not give quetiapine to a child without a doctor's advice. Extended-release quetiapine (Seroquel XR) is for use only in adults and should not be given to anyone younger than 25years old. How should I take quetiapine? Follow all directions on your prescription label. Never take quetiapine in larger amounts, or for longer than recommended by your doctor. High doses or long-term use of quetiapine can cause a serious movement disorder that may not be reversible. Symptoms of this disorder include tremors or other uncontrollable muscle movements. Take this medicine with a full glass of water. You may take quetiapine with or without food. Do not crush, chew, or break an extended-release tablet. Swallow it whole. Quetiapine may cause you to have high blood sugar (hyperglycemia). If you are diabetic, check your blood sugar levels on a regular basis while you are taking quetiapine. You should not stop using quetiapine suddenly. Stopping suddenly may make your condition worse. Blood pressure may need to be checked often in a child or teenager taking quetiapine.   Quetiapine can cause you to have a false positive drug screening test. If you provide a urine sample for drug screening, tell the laboratory staff that you are taking quetiapine. Store at room temperature away from moisture and heat. What happens if I miss a dose? Take the missed dose as soon as you remember. Skip the missed dose if it is almost time for your next scheduled dose. Do not  take extra medicine to make up the missed dose. What happens if I overdose? Seek emergency medical attention or call the Poison Help line at 1-147.649.4949. An overdose of quetiapine can be fatal.  What should I avoid while taking quetiapine? Avoid drinking alcohol. Dangerous side effects could occur. Quetiapine may impair your thinking or reactions. Avoid driving or operating machinery until you know how this medicine will affect you. Avoid getting up too fast from a sitting or lying position, or you may feel dizzy. Dizziness or severe drowsiness can cause falls, fractures, or other injuries. Avoid becoming overheated or dehydrated during exercise and in hot weather. You may be more prone to heat stroke. What are the possible side effects of quetiapine? Get emergency medical help if you have signs of an allergic reaction: hives; difficulty breathing; swelling of your face, lips, tongue, or throat. Report any new or worsening symptoms to your doctor, such as: mood or behavior changes, anxiety, panic attacks, trouble sleeping, or if you feel impulsive, irritable, agitated, hostile, aggressive, restless, hyperactive (mentally or physically), more depressed, or have thoughts about suicide or hurting yourself.   Call your doctor at once if you have:  · twitching or uncontrollable movements of your eyes, lips, tongue, face, arms, or legs;  · mask-like appearance of the face, trouble swallowing, problems with speech;  · a light-headed feeling, like you might pass out;  · blurred vision, tunnel vision, eye pain, or seeing halos around lights;  · severe nervous system reaction --very stiff (rigid) muscles, high fever, sweating, confusion, fast or uneven heartbeats, tremors, fainting;  · high blood sugar --increased thirst, increased urination, hunger, dry mouth, fruity breath odor, drowsiness, dry skin, blurred vision, weight loss; or  · low blood cell counts --sudden weakness or ill feeling, fever, chills, cold or flu symptoms, cough, sore throat, red or swollen gums, painful mouth sores, skin sores, trouble breathing. Common side effects may include:  · trouble with movement;  · dizziness, weakness;  · increased appetite, weight gain;  · sore throat;  · dry mouth; or  · nausea, vomiting, stomach pain, constipation. This is not a complete list of side effects and others may occur. Call your doctor for medical advice about side effects. You may report side effects to FDA at 6-214-FDA-9891. What other drugs will affect quetiapine? Quetiapine can cause a serious heart problem if you use certain medicines at the same time, including antibiotics, antidepressants, heart rhythm medicine, antipsychotic medicines, and medicines to treat cancer, malaria, HIV or AIDS. Tell your doctor about all medicines you use, and those you start or stop using during your treatment with quetiapine. Taking quetiapine with other drugs that make you sleepy or slow your breathing can cause dangerous or life-threatening side effects. Ask your doctor before taking a sleeping pill, narcotic pain medicine, prescription cough medicine, a muscle relaxer, or medicine for anxiety, depression, or seizures. Many drugs can interact with quetiapine. Not all possible interactions are listed here. Tell your doctor about all your current medicines and any you start or stop using, especially:  · antibiotic or antifungal medicine;  · antiviral medicine to treat hepatitis or HIV/AIDS;  · heart or blood pressure medicine;  · medicine to treat mental illness;  · Margarita's wort;  · seizure medicine; or  · tuberculosis medicine.   This list is not complete and many other drugs can interact with quetiapine. This includes prescription and over-the-counter medicines, vitamins, and herbal products. Give a list of all your medicines to any healthcare provider who treats you. Where can I get more information? Your pharmacist can provide more information about quetiapine. Remember, keep this and all other medicines out of the reach of children, never share your medicines with others, and use this medication only for the indication prescribed. Every effort has been made to ensure that the information provided by [address] is accurate, up-to-date, and complete, but no guarantee is made to that effect. Drug information contained herein may be time sensitive. [facility] information has been compiled for use by healthcare practitioners and consumers in the [place] and therefore [facility] does not warrant that uses outside of the [place] are appropriate, unless specifically indicated otherwise. [facility]'s drug information does not endorse drugs, diagnose patients or recommend therapy. [facility] drug information is an informational resource designed to assist licensed healthcare practitioners in caring for their patients and/or to serve consumers viewing this service as a supplement to, and not a substitute for, the expertise, skill, knowledge and judgment of healthcare practitioners. The absence of a warning for a given drug or drug combination in no way should be construed to indicate that the drug or drug combination is safe, effective or appropriate for any given patient. [facility] does not assume any responsibility for any aspect of healthcare administered with the aid of information [facility] provides. The information contained herein is not intended to cover all possible uses, directions, precautions, warnings, drug interactions, allergic reactions, or adverse effects.  If you have questions about the drugs you are taking, check with your doctor, nurse or pharmacist.  Copyright 0329-6451 [employer]. Version: 14.05. Revision date: 2/28/2017. Care instructions adapted under license by [facility] ([facility]). If you have questions about a medical condition or this instruction, always ask your healthcare professional. [address] any warranty or liability for your use of this information. Controlled Substances Monitoring: Attestation: The Prescription Monitoring Report for this patient was reviewed today. ([id number]) [facility], APRN)            Additional Instructions: As always, patient is advised to bring in medication bottles in order to correctly reconcile with our current list.    Justin Hernandez received counseling on the following healthy behaviors: n/a    Patient given educational materials on dx    I have instructed Vickie to complete a self tracking handout on n/a and instructed them to bring it with them to her next appointment. Discussed use, benefit, and side effects of prescribed medications. Barriers to medication compliance addressed. All patient questions answered. Pt voiced understanding.      [facility], APRN

## 2019-04-18 NOTE — TELEPHONE ENCOUNTER
Called patient, spoke with: Patient regarding the results of the patients most recent xrays. I advised pt of Francine Hathaway recommendations.    Patient did voice understanding

## 2019-04-18 NOTE — TELEPHONE ENCOUNTER
----- Message from Leopoldo Shirts, APRN sent at 4/18/2019  5:25 PM CDT -----  Right foot x-ray shows normal bony alignment. There is no fractures identified. Soft tissues are within normal limits. Overall this is great news.

## 2019-04-18 NOTE — PATIENT INSTRUCTIONS
Patient Education        quetiapine  Pronunciation:  carlyle buenrostro  Brand:  SEROquel, SEROquel XR  What is the most important information I should know about quetiapine? Some young people have thoughts about suicide when first taking quetiapine. Stay alert to changes in your mood or symptoms. Report any new or worsening symptoms to your doctor. Quetiapine is not approved for use in psychotic conditions related to dementia. Quetiapine may increase the risk of death in older adults with dementia-related conditions. What is quetiapine? Quetiapine is an antipsychotic medicine. It works by changing the actions of chemicals in the brain. Quetiapine is used to treat schizophrenia in adults and children who are at least 15years old. Quetiapine is used to treat bipolar disorder (manic depression) in adults and children who are at least 8years old. Quetiapine is also used together with antidepressant medications to treat major depressive disorder in adults. Extended-release quetiapine (Seroquel XR) is for use only in adults and should not be given to anyone younger than 25years old. Quetiapine may also be used for purposes not listed in this medication guide. What should I discuss with my healthcare provider before taking quetiapine? You should not use quetiapine if you are allergic to it. Quetiapine is not approved for use in psychotic conditions related to dementia. Quetiapine may increase the risk of death in older adults with dementia-related conditions.   To make sure quetiapine is safe for you, tell your doctor if you have:  · liver or kidney disease;  · heart disease, heart rhythm problems, a history of heart attack or stroke;  · high or low blood pressure;  · a history of low white blood cell (WBC) counts;  · abnormal thyroid tests or prolactin levels;  · seizures or epilepsy;  · cataracts;  · high cholesterol or triglycerides;  · a personal or family history of diabetes; or  · trouble swallowing. Some young people have thoughts about suicide when first taking quetiapine. Your doctor will need to check your progress at regular visits. Your family or other caregivers should also be alert to changes in your mood or symptoms. Taking antipsychotic medication during the last 3 months of pregnancy may cause problems in the , such as withdrawal symptoms, breathing problems, feeding problems, fussiness, tremors, and limp or stiff muscles. However, you may have withdrawal symptoms or other problems if you stop taking your medicine during pregnancy. If you become pregnant while taking quetiapine, do not stop taking it without your doctor's advice. Quetiapine can pass into breast milk and may harm a nursing baby. You should not breast-feed while you are using quetiapine. Do not give quetiapine to a child without a doctor's advice. Extended-release quetiapine (Seroquel XR) is for use only in adults and should not be given to anyone younger than 25years old. How should I take quetiapine? Follow all directions on your prescription label. Never take quetiapine in larger amounts, or for longer than recommended by your doctor. High doses or long-term use of quetiapine can cause a serious movement disorder that may not be reversible. Symptoms of this disorder include tremors or other uncontrollable muscle movements. Take this medicine with a full glass of water. You may take quetiapine with or without food. Do not crush, chew, or break an extended-release tablet. Swallow it whole. Quetiapine may cause you to have high blood sugar (hyperglycemia). If you are diabetic, check your blood sugar levels on a regular basis while you are taking quetiapine. You should not stop using quetiapine suddenly. Stopping suddenly may make your condition worse. Blood pressure may need to be checked often in a child or teenager taking quetiapine.   Quetiapine can cause you to have a false positive drug screening test. If you provide a urine sample for drug screening, tell the laboratory staff that you are taking quetiapine. Store at room temperature away from moisture and heat. What happens if I miss a dose? Take the missed dose as soon as you remember. Skip the missed dose if it is almost time for your next scheduled dose. Do not  take extra medicine to make up the missed dose. What happens if I overdose? Seek emergency medical attention or call the Poison Help line at 1-599.897.6620. An overdose of quetiapine can be fatal.  What should I avoid while taking quetiapine? Avoid drinking alcohol. Dangerous side effects could occur. Quetiapine may impair your thinking or reactions. Avoid driving or operating machinery until you know how this medicine will affect you. Avoid getting up too fast from a sitting or lying position, or you may feel dizzy. Dizziness or severe drowsiness can cause falls, fractures, or other injuries. Avoid becoming overheated or dehydrated during exercise and in hot weather. You may be more prone to heat stroke. What are the possible side effects of quetiapine? Get emergency medical help if you have signs of an allergic reaction: hives; difficulty breathing; swelling of your face, lips, tongue, or throat. Report any new or worsening symptoms to your doctor, such as: mood or behavior changes, anxiety, panic attacks, trouble sleeping, or if you feel impulsive, irritable, agitated, hostile, aggressive, restless, hyperactive (mentally or physically), more depressed, or have thoughts about suicide or hurting yourself.   Call your doctor at once if you have:  · twitching or uncontrollable movements of your eyes, lips, tongue, face, arms, or legs;  · mask-like appearance of the face, trouble swallowing, problems with speech;  · a light-headed feeling, like you might pass out;  · blurred vision, tunnel vision, eye pain, or seeing halos around lights;  · severe nervous system reaction --very stiff (rigid) muscles, high fever, sweating, confusion, fast or uneven heartbeats, tremors, fainting;  · high blood sugar --increased thirst, increased urination, hunger, dry mouth, fruity breath odor, drowsiness, dry skin, blurred vision, weight loss; or  · low blood cell counts --sudden weakness or ill feeling, fever, chills, cold or flu symptoms, cough, sore throat, red or swollen gums, painful mouth sores, skin sores, trouble breathing. Common side effects may include:  · trouble with movement;  · dizziness, weakness;  · increased appetite, weight gain;  · sore throat;  · dry mouth; or  · nausea, vomiting, stomach pain, constipation. This is not a complete list of side effects and others may occur. Call your doctor for medical advice about side effects. You may report side effects to FDA at 8-719-FDA-3391. What other drugs will affect quetiapine? Quetiapine can cause a serious heart problem if you use certain medicines at the same time, including antibiotics, antidepressants, heart rhythm medicine, antipsychotic medicines, and medicines to treat cancer, malaria, HIV or AIDS. Tell your doctor about all medicines you use, and those you start or stop using during your treatment with quetiapine. Taking quetiapine with other drugs that make you sleepy or slow your breathing can cause dangerous or life-threatening side effects. Ask your doctor before taking a sleeping pill, narcotic pain medicine, prescription cough medicine, a muscle relaxer, or medicine for anxiety, depression, or seizures. Many drugs can interact with quetiapine. Not all possible interactions are listed here. Tell your doctor about all your current medicines and any you start or stop using, especially:  · antibiotic or antifungal medicine;  · antiviral medicine to treat hepatitis or HIV/AIDS;  · heart or blood pressure medicine;  · medicine to treat mental illness;  · Margarita's wort;  · seizure medicine; or  · tuberculosis medicine.   This list is not nurse or pharmacist.  Copyright 6394-0854 Jose [facility], [facility]. Version: 14.05. Revision date: 2/28/2017. Care instructions adapted under license by [facility] ([facility]). If you have questions about a medical condition or this instruction, always ask your healthcare professional. [address] any warranty or liability for your use of this information.

## 2019-04-27 DIAGNOSIS — F51.05 INSOMNIA DUE TO OTHER MENTAL DISORDER: ICD-10-CM

## 2019-04-27 DIAGNOSIS — F99 INSOMNIA DUE TO OTHER MENTAL DISORDER: ICD-10-CM

## 2019-04-29 RX ORDER — TRAZODONE HYDROCHLORIDE 50 MG/1
TABLET ORAL
Qty: 60 TABLET | Refills: 5 | Status: SHIPPED | OUTPATIENT
Start: 2019-04-29 | End: 2019-05-17

## 2019-04-29 RX ORDER — SIMVASTATIN 20 MG
20 TABLET ORAL EVERY EVENING
Qty: 30 TABLET | Refills: 5 | Status: SHIPPED | OUTPATIENT
Start: 2019-04-29 | End: 2019-06-14 | Stop reason: SDUPTHER

## 2019-04-29 NOTE — TELEPHONE ENCOUNTER
Received fax from pharmacy requesting refill on pts medication(s). Pt was last seen in office on 4/18/2019  and has a follow up scheduled for 5/17/2019. Will send request to  [facility]  for patient.      Requested Prescriptions     Pending Prescriptions Disp Refills    traZODone (DESYREL) 50 MG tablet [Pharmacy Med Name: TRAZODONE 50 MG TABLET] 60 tablet 1     Sig: TAKE 1 TABLET BY MOUTH NIGHTLY *MAY TAKE 1-2 TABLETS AT BEDTIME*

## 2019-04-29 NOTE — TELEPHONE ENCOUNTER
Received fax from pharmacy requesting refill on pts medication(s). Pt was last seen in office on 4/18/2019  and has a follow up scheduled for 5/17/2019. Will send request to  [facility]  for patient.      Requested Prescriptions     Pending Prescriptions Disp Refills    simvastatin (ZOCOR) 20 MG tablet [Pharmacy Med Name: SIMVASTATIN 20 MG TABLET] 30 tablet 5     Sig: Take 1 tablet by mouth every evening

## 2019-05-17 ENCOUNTER — OFFICE VISIT (OUTPATIENT)
Dept: PRIMARY CARE CLINIC | Age: 39
End: 2019-05-17
Payer: COMMERCIAL

## 2019-05-17 VITALS
SYSTOLIC BLOOD PRESSURE: 122 MMHG | HEIGHT: 66 IN | TEMPERATURE: 97.2 F | BODY MASS INDEX: 34.07 KG/M2 | WEIGHT: 212 LBS | OXYGEN SATURATION: 97 % | DIASTOLIC BLOOD PRESSURE: 83 MMHG

## 2019-05-17 DIAGNOSIS — M54.12 CERVICAL RADICULOPATHY: ICD-10-CM

## 2019-05-17 DIAGNOSIS — Z00.00 ENCOUNTER FOR PREVENTIVE HEALTH EXAMINATION: ICD-10-CM

## 2019-05-17 DIAGNOSIS — F41.9 ANXIETY: Primary | ICD-10-CM

## 2019-05-17 DIAGNOSIS — F51.05 INSOMNIA DUE TO OTHER MENTAL DISORDER: ICD-10-CM

## 2019-05-17 DIAGNOSIS — G62.9 NEUROPATHY: ICD-10-CM

## 2019-05-17 DIAGNOSIS — F99 INSOMNIA DUE TO OTHER MENTAL DISORDER: ICD-10-CM

## 2019-05-17 DIAGNOSIS — F41.0 PANIC ATTACK: ICD-10-CM

## 2019-05-17 LAB
ALBUMIN SERPL-MCNC: 4.3 G/DL (ref 3.5–5.2)
ALP BLD-CCNC: 69 U/L (ref 35–104)
ALT SERPL-CCNC: 21 U/L (ref 5–33)
ANION GAP SERPL CALCULATED.3IONS-SCNC: 13 MMOL/L (ref 7–19)
AST SERPL-CCNC: 16 U/L (ref 5–32)
BASOPHILS ABSOLUTE: 0 K/UL (ref 0–0.2)
BASOPHILS RELATIVE PERCENT: 0.5 % (ref 0–1)
BILIRUB SERPL-MCNC: <0.2 MG/DL (ref 0.2–1.2)
BUN BLDV-MCNC: 15 MG/DL (ref 6–20)
CALCIUM SERPL-MCNC: 9.4 MG/DL (ref 8.6–10)
CHLORIDE BLD-SCNC: 102 MMOL/L (ref 98–111)
CHOLESTEROL, TOTAL: 221 MG/DL (ref 160–199)
CO2: 24 MMOL/L (ref 22–29)
CREAT SERPL-MCNC: 0.9 MG/DL (ref 0.5–0.9)
CREATININE URINE: 88.3 MG/DL (ref 4.2–622)
EOSINOPHILS ABSOLUTE: 0.1 K/UL (ref 0–0.6)
EOSINOPHILS RELATIVE PERCENT: 1.2 % (ref 0–5)
GFR NON-AFRICAN AMERICAN: >60
GLUCOSE BLD-MCNC: 81 MG/DL (ref 74–109)
HBA1C MFR BLD: 5.5 % (ref 4–6)
HCT VFR BLD CALC: 38.6 % (ref 37–47)
HDLC SERPL-MCNC: 43 MG/DL (ref 65–121)
HEMOGLOBIN: 12.2 G/DL (ref 12–16)
LDL CHOLESTEROL CALCULATED: 100 MG/DL
LYMPHOCYTES ABSOLUTE: 1.7 K/UL (ref 1.1–4.5)
LYMPHOCYTES RELATIVE PERCENT: 20.6 % (ref 20–40)
MCH RBC QN AUTO: 28.6 PG (ref 27–31)
MCHC RBC AUTO-ENTMCNC: 31.6 G/DL (ref 33–37)
MCV RBC AUTO: 90.6 FL (ref 81–99)
MICROALBUMIN UR-MCNC: 3.3 MG/DL (ref 0–19)
MICROALBUMIN/CREAT UR-RTO: 37.4 MG/G
MONOCYTES ABSOLUTE: 0.6 K/UL (ref 0–0.9)
MONOCYTES RELATIVE PERCENT: 7.5 % (ref 0–10)
NEUTROPHILS ABSOLUTE: 5.8 K/UL (ref 1.5–7.5)
NEUTROPHILS RELATIVE PERCENT: 69.7 % (ref 50–65)
PDW BLD-RTO: 15.1 % (ref 11.5–14.5)
PLATELET # BLD: 300 K/UL (ref 130–400)
PMV BLD AUTO: 10.3 FL (ref 9.4–12.3)
POTASSIUM SERPL-SCNC: 4.3 MMOL/L (ref 3.5–5)
RBC # BLD: 4.26 M/UL (ref 4.2–5.4)
SODIUM BLD-SCNC: 139 MMOL/L (ref 136–145)
T4 FREE: 0.8 NG/DL (ref 0.9–1.7)
TOTAL PROTEIN: 7.5 G/DL (ref 6.6–8.7)
TRIGL SERPL-MCNC: 388 MG/DL (ref 0–149)
TSH SERPL DL<=0.05 MIU/L-ACNC: 0.78 UIU/ML (ref 0.27–4.2)
WBC # BLD: 8.3 K/UL (ref 4.8–10.8)

## 2019-05-17 PROCEDURE — 99213 OFFICE O/P EST LOW 20 MIN: CPT | Performed by: NURSE PRACTITIONER

## 2019-05-17 PROCEDURE — G8427 DOCREV CUR MEDS BY ELIG CLIN: HCPCS | Performed by: NURSE PRACTITIONER

## 2019-05-17 PROCEDURE — 4004F PT TOBACCO SCREEN RCVD TLK: CPT | Performed by: NURSE PRACTITIONER

## 2019-05-17 PROCEDURE — G8417 CALC BMI ABV UP PARAM F/U: HCPCS | Performed by: NURSE PRACTITIONER

## 2019-05-17 RX ORDER — CLONAZEPAM 2 MG/1
2 TABLET ORAL 2 TIMES DAILY PRN
Qty: 60 TABLET | Refills: 0 | Status: SHIPPED | OUTPATIENT
Start: 2019-05-17 | End: 2019-06-14 | Stop reason: SDUPTHER

## 2019-05-17 RX ORDER — GABAPENTIN 600 MG/1
600 TABLET ORAL 2 TIMES DAILY
Qty: 60 TABLET | Refills: 3 | Status: SHIPPED | OUTPATIENT
Start: 2019-05-17 | End: 2019-10-08 | Stop reason: SDUPTHER

## 2019-05-17 ASSESSMENT — ENCOUNTER SYMPTOMS
SORE THROAT: 0
VOICE CHANGE: 0
DIARRHEA: 0
EYE REDNESS: 0
VOMITING: 0
CONSTIPATION: 0
SHORTNESS OF BREATH: 0
RHINORRHEA: 0
COUGH: 0

## 2019-05-17 NOTE — PATIENT INSTRUCTIONS
worry when you lie down, start a worry book. Well before bedtime, write down your worries, and then set the book and your concerns aside. · Try meditation or other relaxation techniques before you go to bed. · If you cannot fall asleep, get up and go to another room until you feel sleepy. Do something relaxing. Repeat your bedtime routine before you go to bed again. · Make your house quiet and calm about an hour before bedtime. Turn down the lights, turn off the TV, log off the computer, and turn down the volume on music. This can help you relax after a busy day. When should you call for help? Watch closely for changes in your health, and be sure to contact your doctor if:    · Your efforts to improve your sleep do not work.     · Your insomnia gets worse.     · You have been feeling down, depressed, or hopeless or have lost interest in things that you usually enjoy. Where can you learn more? Go to [email or web address] org and sign in to your inc[employer]ET account. Enter P513 in the [employer] box to learn more about \"Insomnia: Care Instructions. \"     If you do not have an account, please click on the \"Sign Up Now\" link. Current as of: June 28, 2018  Content Version: 12.0  © 4575-5484 [employer]. Care instructions adapted under license by [facility] [place] ([facility]). If you have questions about a medical condition or this instruction, always ask your healthcare professional. [place] [address] any warranty or liability for your use of this information.

## 2019-05-17 NOTE — PROGRESS NOTES
[facility]lysia Isai[facility]  Phone [phone]   Fax [phone]      OFFICE VISIT: 2019    Manas Gonzalez- : 1980    Chief Zofia Urias is a 43 y.o. female who is here for Medication Refill     HPI  The patient presents today for follow-up of anxiety/insomnia. She is sleeping with Seroquel. She is taking Seroquel 100-200 mg nightly  She is sleeping 8-10 hours a day. Anxiety has been variable. \"I have been needing the Klonopin more during the day lately. \"  Moods are \"okay\" with Cymbalta 60 mg and Klonopin 2 mg prn. \"My  is still here. We have been getting along. \"  She ran out of Klonopin a couple of days ago. height is 5' 6\" (1.676 m) and weight is 212 lb (96.2 kg). Her temporal temperature is 97.2 °F (36.2 °C). Her blood pressure is 122/83. Her oxygen saturation is 97%. Body mass index is 34.22 kg/m². Results for orders placed or performed in visit on 19   POCT Rapid Drug Screen   Result Value Ref Range    Amphetamine Screen, Urine neg     Barbiturate Screen, Urine neg     Benzodiazepine Screen, Urine neg     Buprenorphine Urine neg     Cocaine Metabolite Screen, Urine neg     Gabapentin Screen, Urine neg     MDMA, Urine neg     Methamphetamine, Urine neg     Methadone Screen, Urine neg     Opiate Scrn, Ur neg     Oxycodone Screen, Ur neg     PCP Screen, Urine neg     Propoxyphene Screen, Urine neg     THC Screen, Urine neg     Tricyclic Antidepressants, Urine neg      have reviewed the following with the Ms. Dillard   Lab Review   Office Visit on 2019   Component Date Value    Amphetamine Screen, Urine 2019 neg     Barbiturate Screen, Urine 2019 neg     Benzodiazepine Screen, U* 2019 neg     Buprenorphine Urine 2019 neg     Cocaine Metabolite Scree* 2019 neg     Gabapentin Screen, Urine 2019 neg     MDMA, Urine 2019 neg     Methamphetamine, Urine 2019 neg     Methadone Screen, Urine 2019 neg     Opiate Scrn, Ur 02/08/2019 neg     Oxycodone Screen, Ur 02/08/2019 neg     PCP Screen, Urine 02/08/2019 neg     Propoxyphene Screen, Uri* 02/08/2019 neg     THC Screen, Urine 43/67/4717 neg     Tricyclic Antidepressant* 49/91/3976 neg    Hospital Outpatient Visit on 01/17/2019   Component Date Value    Summary Compliance Drug * 01/17/2019 SEE SCAN DOC      Copies of these are in the chart. Prior to Visit Medications    Medication Sig Taking? Authorizing Provider   gabapentin (NEURONTIN) 600 MG tablet Take 1 tablet by mouth 2 times daily for 30 days. Yes RYAN Peters   clonazePAM (KLONOPIN) 2 MG tablet Take 1 tablet by mouth 2 times daily as needed (anxiety) for up to 30 days.  Yes RYAN Peters   simvastatin (ZOCOR) 20 MG tablet Take 1 tablet by mouth every evening Yes RYAN Mazariegos   QUEtiapine (SEROQUEL) 100 MG tablet Take 1 tablet by mouth nightly Yes RYAN Peters   DULoxetine (CYMBALTA) 60 MG extended release capsule Take 1 capsule by mouth daily Yes RYAN Peters   [employer] CREA [place] Pain Cream #4 Add Gabapentin 6% Apply 1-2 pumps to affected area 3-4 times a day Yes RYAN Real   amphetamine-dextroamphetamine (ADDERALL) 10 MG tablet  Yes Historical Provider, MD   amphetamine-dextroamphetamine (ADDERALL XR) 30 MG extended release capsule  Yes Historical Provider, MD       Allergies: Amoxicillin    Past Medical History:   Diagnosis Date    ADHD (attention deficit hyperactivity disorder)     Anxiety     Arthritis     Chronic back pain     Lichen sclerosus     PCOS (polycystic ovarian syndrome)        Past Surgical History:   Procedure Laterality Date    COLONOSCOPY  11/16/2016    Dr Carmen Bar [facility]normal, thick stool in right colon, 5-10 yr recall    UTERINE SUSPENSION  2001       Social History     Tobacco Use    Smoking status: Current Every Day Smoker     Packs/day: 2.00     Types: Cigarettes    Smokeless tobacco: Never Used PLAN    No orders of the defined types were placed in this encounter. Patient is going to go down for lab work today. Return in about 1 month (around 6/17/2019), or if symptoms worsen or fail to improve. Patient Instructions       Patient Education        Insomnia: Care Instructions  Your Care Instructions    Insomnia is the inability to sleep well. It is a common problem for most people at some time. Insomnia may make it hard for you to get to sleep, stay asleep, or sleep as long as you need to. This can make you tired and grouchy during the day. It can also make you forgetful, less effective at work, and unhappy. Insomnia can be caused by conditions such as depression or anxiety. Pain can also affect your ability to sleep. When these problems are solved, the insomnia usually clears up. But sometimes bad sleep habits can cause insomnia. If insomnia is affecting your work or your enjoyment of life, you can take steps to improve your sleep. Follow-up care is a key part of your treatment and safety. Be sure to make and go to all appointments, and call your doctor if you are having problems. It's also a good idea to know your test results and keep a list of the medicines you take. How can you care for yourself at home? What to avoid  · Do not have drinks with caffeine, such as coffee or black tea, for 8 hours before bed. · Do not smoke or use other types of tobacco near bedtime. Nicotine is a stimulant and can keep you awake. · Avoid drinking alcohol late in the evening, because it can cause you to wake in the middle of the night. · Do not eat a big meal close to bedtime. If you are hungry, eat a light snack. · Do not drink a lot of water close to bedtime, because the need to urinate may wake you up during the night. · Do not read or watch TV in bed. Use the bed only for sleeping and sexual activity.   What to try  · Go to bed at the same time every night, and wake up at the same time every RYAN)            Additional Instructions: As always, patient is advised to bring in medication bottles in order to correctly reconcile with our current list.    Brody Lui received counseling on the following healthy behaviors: n/a    Patient given educational materials on dx    I have instructed Vickie to complete a self tracking handout on n/a and instructed them to bring it with them to her next appointment. Discussed use, benefit, and side effects of prescribed medications. Barriers to medication compliance addressed. All patient questions answered. Pt voiced understanding.      RYAN Wen

## 2019-05-20 ENCOUNTER — TELEPHONE (OUTPATIENT)
Dept: PRIMARY CARE CLINIC | Age: 39
End: 2019-05-20

## 2019-05-20 NOTE — TELEPHONE ENCOUNTER
----- Message from RYAN Zuniga sent at 5/17/2019  4:35 PM CDT -----  Urine good slight protein in urine cont tight blood pressure control   Thyroid stable no changes  a1c normal glucose control  Cmp electrolytes liver and kidneys wnl  Cholesterol LDL at 100 was 59 did you stop cholesterol medication?   If so restart if not increase to 40mg and recheck in 6 weeks  CBC stable no anemia or concerns

## 2019-05-20 NOTE — TELEPHONE ENCOUNTER
----- Message from RYAN Osborn sent at 5/17/2019  4:03 PM CDT -----  CBC normal no anemia or concerns

## 2019-06-14 ENCOUNTER — OFFICE VISIT (OUTPATIENT)
Dept: PRIMARY CARE CLINIC | Age: 39
End: 2019-06-14
Payer: COMMERCIAL

## 2019-06-14 VITALS
TEMPERATURE: 97.9 F | HEIGHT: 66 IN | DIASTOLIC BLOOD PRESSURE: 89 MMHG | OXYGEN SATURATION: 96 % | SYSTOLIC BLOOD PRESSURE: 135 MMHG | WEIGHT: 209.38 LBS | BODY MASS INDEX: 33.65 KG/M2 | HEART RATE: 110 BPM

## 2019-06-14 DIAGNOSIS — E78.2 MIXED HYPERLIPIDEMIA: ICD-10-CM

## 2019-06-14 DIAGNOSIS — F99 INSOMNIA DUE TO OTHER MENTAL DISORDER: ICD-10-CM

## 2019-06-14 DIAGNOSIS — F41.0 PANIC ATTACK: ICD-10-CM

## 2019-06-14 DIAGNOSIS — F41.9 ANXIETY: Primary | ICD-10-CM

## 2019-06-14 DIAGNOSIS — F51.05 INSOMNIA DUE TO OTHER MENTAL DISORDER: ICD-10-CM

## 2019-06-14 PROCEDURE — 99213 OFFICE O/P EST LOW 20 MIN: CPT | Performed by: NURSE PRACTITIONER

## 2019-06-14 PROCEDURE — G8427 DOCREV CUR MEDS BY ELIG CLIN: HCPCS | Performed by: NURSE PRACTITIONER

## 2019-06-14 PROCEDURE — G8417 CALC BMI ABV UP PARAM F/U: HCPCS | Performed by: NURSE PRACTITIONER

## 2019-06-14 PROCEDURE — 1036F TOBACCO NON-USER: CPT | Performed by: NURSE PRACTITIONER

## 2019-06-14 RX ORDER — SIMVASTATIN 40 MG
40 TABLET ORAL EVERY EVENING
Qty: 30 TABLET | Refills: 5 | Status: SHIPPED | OUTPATIENT
Start: 2019-06-14 | End: 2019-12-18 | Stop reason: SDUPTHER

## 2019-06-14 RX ORDER — CLONAZEPAM 2 MG/1
2 TABLET ORAL 2 TIMES DAILY PRN
Qty: 60 TABLET | Refills: 0 | Status: SHIPPED | OUTPATIENT
Start: 2019-06-14 | End: 2019-08-15 | Stop reason: SDUPTHER

## 2019-06-14 ASSESSMENT — ENCOUNTER SYMPTOMS
DIARRHEA: 0
CONSTIPATION: 0
VOICE CHANGE: 0
SHORTNESS OF BREATH: 0
EYE REDNESS: 0
COUGH: 0
VOMITING: 0
SORE THROAT: 0
RHINORRHEA: 0

## 2019-06-14 NOTE — PATIENT INSTRUCTIONS
Eat a variety of foods every day. Replace red meat with fish, poultry, and soy protein (like tofu). Limit processed and packaged foods like chips, crackers, and cookies. Bake, broil, or steam foods. Don't abrams them. Limit foods high in cholesterol. These include egg yolks. Be physically active. Get at least 30 minutes of exercise on most days of the week. Walking is a good choice. You also may want to do other activities, such as running, swimming, cycling, or playing tennis or team sports. Stay at a healthy weight or lose weight by making the changes in eating and physical activity listed above. Losing just a small amount of weight, even 5 to 10 pounds, can reduce your risk for having a heart attack or stroke.

## 2019-06-14 NOTE — PROGRESS NOTES
Alysia Miles  Phone [phone]   Fax [phone]      OFFICE VISIT: 2019    Kamari Paget- : 1980    Chief Katerina Monge is a 43 y.o. female who is here for 1 Month Follow-Up (anxiety) and Medication Refill     HPI  The patient presents today for monthly follow-up of insomnia and anxiety. She is taking Seroquel 100 mg nightly. She is sleeping about 8 hours nightly. Moods are \"alright. \"  She is taking Cymbalta 60 mg and Klonopin 2 mg BID/prn. Last fill was 2019, #60  Her  is still here. They are getting along good. She is getting a new dog tomorrow. \"I quit smoking again. \"  \"I am vaping. I want to quit it too. \"     height is 5' 6\" (1.676 m) and weight is 209 lb 6 oz (95 kg). Her temporal temperature is 97.9 °F (36.6 °C). Her blood pressure is 135/89 and her pulse is 110. Her oxygen saturation is 96%. Body mass index is 33.79 kg/m².     Results for orders placed or performed in visit on 19   CBC Auto Differential   Result Value Ref Range    WBC 8.3 4.8 - 10.8 K/uL    RBC 4.26 4.20 - 5.40 M/uL    Hemoglobin 12.2 12.0 - 16.0 g/dL    Hematocrit 38.6 37.0 - 47.0 %    MCV 90.6 81.0 - 99.0 fL    MCH 28.6 27.0 - 31.0 pg    MCHC 31.6 (L) 33.0 - 37.0 g/dL    RDW 15.1 (H) 11.5 - 14.5 %    Platelets 726 381 - 529 K/uL    MPV 10.3 9.4 - 12.3 fL    Neutrophils % 69.7 (H) 50.0 - 65.0 %    Lymphocytes % 20.6 20.0 - 40.0 %    Monocytes % 7.5 0.0 - 10.0 %    Eosinophils % 1.2 0.0 - 5.0 %    Basophils % 0.5 0.0 - 1.0 %    Neutrophils # 5.8 1.5 - 7.5 K/uL    Lymphocytes # 1.7 1.1 - 4.5 K/uL    Monocytes # 0.60 0.00 - 0.90 K/uL    Eosinophils # 0.10 0.00 - 0.60 K/uL    Basophils # 0.00 0.00 - 0.20 K/uL   Comprehensive Metabolic Panel   Result Value Ref Range    Sodium 139 136 - 145 mmol/L    Potassium 4.3 3.5 - 5.0 mmol/L    Chloride 102 98 - 111 mmol/L    CO2 24 22 - 29 mmol/L    Anion Gap 13 7 - 19 mmol/L    Glucose 81 74 - 109 mg/dL    BUN 15 6 - 20 mg/dL CREATININE 0.9 0.5 - 0.9 mg/dL    GFR Non-African American >60 >60    Calcium 9.4 8.6 - 10.0 mg/dL    Total Protein 7.5 6.6 - 8.7 g/dL    Alb 4.3 3.5 - 5.2 g/dL    Total Bilirubin <0.2 0.2 - 1.2 mg/dL    Alkaline Phosphatase 69 35 - 104 U/L    ALT 21 5 - 33 U/L    AST 16 5 - 32 U/L   TSH without Reflex   Result Value Ref Range    TSH 0.780 0.270 - 4.200 uIU/mL   T4, Free   Result Value Ref Range    T4 Free 0.8 (L) 0.9 - 1.7 ng/dL   Lipid Panel   Result Value Ref Range    Cholesterol, Total 221 (H) 160 - 199 mg/dL    Triglycerides 388 (H) 0 - 149 mg/dL    HDL 43 (L) 65 - 121 mg/dL    LDL Calculated 100 <100 mg/dL   Microalbumin / Creatinine Urine Ratio   Result Value Ref Range    Microalbumin, Random Urine 3.30 0.00 - 19.00 mg/dL    Creatinine, Ur 88.3 4.2 - 622.0 mg/dL    Microalbumin Creatinine Ratio 37.4 mg/g   Hemoglobin A1C   Result Value Ref Range    Hemoglobin A1C 5.5 4.0 - 6.0 %     have reviewed the following with the Ms. Dillard   Lab Review   Orders Only on 05/17/2019   Component Date Value    WBC 05/17/2019 8.3     RBC 05/17/2019 4.26     Hemoglobin 05/17/2019 12.2     Hematocrit 05/17/2019 38.6     MCV 05/17/2019 90.6     MCH 05/17/2019 28.6     MCHC 05/17/2019 31.6*    RDW 05/17/2019 15.1*    Platelets 71/66/6536 300     MPV 05/17/2019 10.3     Neutrophils % 05/17/2019 69.7*    Lymphocytes % 05/17/2019 20.6     Monocytes % 05/17/2019 7.5     Eosinophils % 05/17/2019 1.2     Basophils % 05/17/2019 0.5     Neutrophils # 05/17/2019 5.8     Lymphocytes # 05/17/2019 1.7     Monocytes # 05/17/2019 0.60     Eosinophils # 05/17/2019 0.10     Basophils # 05/17/2019 0.00     Sodium 05/17/2019 139     Potassium 05/17/2019 4.3     Chloride 05/17/2019 102     CO2 05/17/2019 24     Anion Gap 05/17/2019 13     Glucose 05/17/2019 81     BUN 05/17/2019 15     CREATININE 05/17/2019 0.9     GFR Non- 05/17/2019 >60     Calcium 05/17/2019 9.4     Total Protein 05/17/2019 7.5     Alb 05/17/2019 4.3     Total Bilirubin 05/17/2019 <0.2     Alkaline Phosphatase 05/17/2019 69     ALT 05/17/2019 21     AST 05/17/2019 16     TSH 05/17/2019 0.780     T4 Free 05/17/2019 0.8*    Cholesterol, Total 05/17/2019 221*    Triglycerides 05/17/2019 388*    HDL 05/17/2019 43*    LDL Calculated 05/17/2019 100     Microalbumin, Random Uri* 05/17/2019 3.30     Creatinine, Ur 05/17/2019 88.3     Microalbumin Creatinine * 05/17/2019 37.4     Hemoglobin A1C 05/17/2019 5.5    Office Visit on 02/08/2019   Component Date Value    Amphetamine Screen, Urine 02/08/2019 neg     Barbiturate Screen, Urine 02/08/2019 neg     Benzodiazepine Screen, U* 02/08/2019 neg     Buprenorphine Urine 02/08/2019 neg     Cocaine Metabolite Scree* 02/08/2019 neg     Gabapentin Screen, Urine 02/08/2019 neg     MDMA, Urine 02/08/2019 neg     Methamphetamine, Urine 02/08/2019 neg     Methadone Screen, Urine 02/08/2019 neg     Opiate Scrn, Ur 02/08/2019 neg     Oxycodone Screen, Ur 02/08/2019 neg     PCP Screen, Urine 02/08/2019 neg     Propoxyphene Screen, Uri* 02/08/2019 neg     THC Screen, Urine 10/95/6281 neg     Tricyclic Antidepressant* 62/43/1997 neg    Hospital Outpatient Visit on 01/17/2019   Component Date Value    Summary Compliance Drug * 01/17/2019 SEE SCAN DOC      Copies of these are in the chart. Prior to Visit Medications    Medication Sig Taking? Authorizing Provider   clonazePAM (KLONOPIN) 2 MG tablet Take 1 tablet by mouth 2 times daily as needed (anxiety) for up to 30 days. Yes RYAN Peters   simvastatin (ZOCOR) 40 MG tablet Take 1 tablet by mouth every evening Yes RYAN Peters   gabapentin (NEURONTIN) 600 MG tablet Take 1 tablet by mouth 2 times daily for 30 days.  Yes RYAN Peters   QUEtiapine (SEROQUEL) 100 MG tablet Take 1 tablet by mouth nightly Yes RYAN Peters   DULoxetine (CYMBALTA) 60 MG extended release capsule Take 1 capsule by mouth daily Yes Ida Hathaway APRN   [employer] CREA [place] Pain Cream #4 Add Gabapentin 6% Apply 1-2 pumps to affected area 3-4 times a day Yes [address] RYAN Mccord   amphetamine-dextroamphetamine (ADDERALL) 10 MG tablet  Yes Historical Provider, MD   amphetamine-dextroamphetamine (ADDERALL XR) 30 MG extended release capsule  Yes Historical Provider, MD       Allergies: Amoxicillin    Past Medical History:   Diagnosis Date    ADHD (attention deficit hyperactivity disorder)     Anxiety     Arthritis     Chronic back pain     Lichen sclerosus     PCOS (polycystic ovarian syndrome)        Past Surgical History:   Procedure Laterality Date    COLONOSCOPY  2016    Dr [facility])-normal, thick stool in right colon, 5-10 yr recall    UTERINE SUSPENSION         Social History     Tobacco Use    Smoking status: Former Smoker     Packs/day: 2.00     Years: 20.00     Pack years: 40.00     Types: Cigarettes     Last attempt to quit: 2019     Years since quittin.0    Smokeless tobacco: Never Used   Substance Use Topics    Alcohol use: Yes     Alcohol/week: 0.0 oz       No family history on file. Review of Systems   Constitutional: Negative for chills, fatigue and fever. HENT: Negative for congestion, ear pain, rhinorrhea, sneezing, sore throat and voice change. Eyes: Negative for redness. Respiratory: Negative for cough and shortness of breath. Cardiovascular: Negative for chest pain. Gastrointestinal: Negative for constipation, diarrhea and vomiting. Skin: Negative for rash. Neurological: Negative for dizziness, numbness and headaches. Psychiatric/Behavioral: Positive for decreased concentration (ADHD managed by Dr. Anmol Zavaleta) and sleep disturbance (improved with Seroquel). The patient is nervous/anxious (stable). Panic attacks--stable       Physical Exam   Constitutional: She appears well-developed. Overweight   HENT:   Head: Normocephalic.    Right Ear: Tympanic membrane and external ear normal.   Left Ear: Tympanic membrane and external ear normal.   Nose: Nose normal.   Mouth/Throat: Oropharynx is clear and moist.   Neck: Normal range of motion. Cardiovascular: Normal rate and regular rhythm. Pulmonary/Chest: Effort normal and breath sounds normal. No stridor. No respiratory distress. She has no wheezes. She has no rales. Abdominal: Soft. Bowel sounds are normal.   Musculoskeletal:        Cervical back: She exhibits tenderness and pain. Neurological: She is alert. Skin: Skin is dry. Psychiatric: She has a normal mood and affect. Her behavior is normal. Judgment and thought content normal.   Vitals reviewed. ASSESSMENT      ICD-10-CM    1. Anxiety F41.9 clonazePAM (KLONOPIN) 2 MG tablet   2. Panic attack F41.0 clonazePAM (KLONOPIN) 2 MG tablet   3. Insomnia due to other mental disorder F51.05 clonazePAM (KLONOPIN) 2 MG tablet    F99    4. Mixed hyperlipidemia E78.2 simvastatin (ZOCOR) 40 MG tablet (increased from 20 mg)         PLAN    No orders of the defined types were placed in this encounter. Return in about 1 month (around 7/14/2019), or if symptoms worsen or fail to improve. Patient Instructions   Eat a variety of foods every day. Replace red meat with fish, poultry, and soy protein (like tofu). Limit processed and packaged foods like chips, crackers, and cookies. Bake, broil, or steam foods. Don't abrams them. Limit foods high in cholesterol. These include egg yolks. Be physically active. Get at least 30 minutes of exercise on most days of the week. Walking is a good choice. You also may want to do other activities, such as running, swimming, cycling, or playing tennis or team sports. Stay at a healthy weight or lose weight by making the changes in eating and physical activity listed above. Losing just a small amount of weight, even 5 to 10 pounds, can reduce your risk for having a heart attack or stroke.                     Controlled Substances Monitoring: Attestation: The Prescription Monitoring Report for this patient was reviewed today. ([id number]) [facility], APRN)            Additional Instructions: As always, patient is advised to bring in medication bottles in order to correctly reconcile with our current list.    Zara Guerra received counseling on the following healthy behaviors: n/a    Patient given educational materials on dx    I have instructed Vickie to complete a self tracking handout on n/a and instructed them to bring it with them to her next appointment. Discussed use, benefit, and side effects of prescribed medications. Barriers to medication compliance addressed. All patient questions answered. Pt voiced understanding.      [facility], APRN

## 2019-08-15 ENCOUNTER — HOSPITAL ENCOUNTER (OUTPATIENT)
Dept: GENERAL RADIOLOGY | Age: 39
Discharge: HOME | End: 2019-08-15
Payer: COMMERCIAL

## 2019-08-15 ENCOUNTER — TELEPHONE (OUTPATIENT)
Dept: PRIMARY CARE CLINIC | Age: 39
End: 2019-08-15

## 2019-08-15 ENCOUNTER — OFFICE VISIT (OUTPATIENT)
Dept: PRIMARY CARE CLINIC | Age: 39
End: 2019-08-15
Payer: COMMERCIAL

## 2019-08-15 VITALS
SYSTOLIC BLOOD PRESSURE: 134 MMHG | OXYGEN SATURATION: 97 % | DIASTOLIC BLOOD PRESSURE: 89 MMHG | TEMPERATURE: 98.3 F | WEIGHT: 215.5 LBS | HEIGHT: 66 IN | BODY MASS INDEX: 34.63 KG/M2 | HEART RATE: 113 BPM

## 2019-08-15 DIAGNOSIS — M89.8X1 PAIN OF RIGHT CLAVICLE: ICD-10-CM

## 2019-08-15 DIAGNOSIS — F41.0 PANIC ATTACK: ICD-10-CM
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
## 2019-08-15 DIAGNOSIS — F99 INSOMNIA DUE TO OTHER MENTAL DISORDER: ICD-10-CM
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
## 2019-08-15 DIAGNOSIS — F41.9 ANXIETY: ICD-10-CM

## 2019-08-15 DIAGNOSIS — F51.05 INSOMNIA DUE TO OTHER MENTAL DISORDER: ICD-10-CM

## 2019-08-15 PROCEDURE — G8417 CALC BMI ABV UP PARAM F/U: HCPCS | Performed by: NURSE PRACTITIONER

## 2019-08-15 PROCEDURE — 1036F TOBACCO NON-USER: CPT | Performed by: NURSE PRACTITIONER

## 2019-08-15 PROCEDURE — 73000 X-RAY EXAM OF COLLAR BONE: CPT

## 2019-08-15 PROCEDURE — 99213 OFFICE O/P EST LOW 20 MIN: CPT | Performed by: NURSE PRACTITIONER

## 2019-08-15 PROCEDURE — G8427 DOCREV CUR MEDS BY ELIG CLIN: HCPCS | Performed by: NURSE PRACTITIONER

## 2019-08-15 RX ORDER — CLONAZEPAM 2 MG/1
2 TABLET ORAL 2 TIMES DAILY PRN
Qty: 60 TABLET | Refills: 0 | Status: SHIPPED | OUTPATIENT
Start: 2019-08-15 | End: 2019-09-13 | Stop reason: SDUPTHER

## 2019-08-15 RX ORDER — QUETIAPINE FUMARATE 100 MG/1
200 TABLET, FILM COATED ORAL NIGHTLY
Qty: 60 TABLET | Refills: 5 | Status: SHIPPED | OUTPATIENT
Start: 2019-08-15 | End: 2019-11-21

## 2019-08-15 ASSESSMENT — ENCOUNTER SYMPTOMS
VOMITING: 0
EYE REDNESS: 0
SHORTNESS OF BREATH: 0
RHINORRHEA: 0
DIARRHEA: 0
COUGH: 0
VOICE CHANGE: 0
SORE THROAT: 0
CONSTIPATION: 0

## 2019-08-15 NOTE — PATIENT INSTRUCTIONS
Patient Education        clonazepam  Pronunciation:  Oliverio medina sheron  Brand:  Shruti Wiggins  What is the most important information I should know about clonazepam?  You should not use this medicine if you have narrow-angle glaucoma or severe liver disease, or if you are allergic to Valium or a similar medicine. Call your doctor if you have any new or worsening symptoms of depression, unusual changes in behavior, or thoughts about suicide or hurting yourself. Clonazepam may be habit-forming. Never share clonazepam with another person. Keep the medication in a place where others cannot get to it. Selling or giving away this medicine is against the law. What is clonazepam?  Clonazepam is a benzodiazepine (omayra-alpa-dye-AZE-eh-peen) that affects chemicals in the brain that may be unbalanced. Clonazepam is also a seizure medicine, also called an anti-epileptic drug. Clonazepam is used to treat certain seizure disorders (including absence seizures or Lennox-Gastaut syndrome) in adults and children. Clonazepam is also used to treat panic disorder (including agoraphobia) in adults. Clonazepam may also be used for purposes not listed in this medication guide. What should I discuss with my healthcare provider before taking clonazepam?  You should not take clonazepam if you have:  · narrow-angle glaucoma;  · severe liver disease; or  · a history of allergic reaction to any benzodiazepine, such as diazepam (Valium), alprazolam (Xanax), lorazepam (Ativan), chlordiazepoxide, flurazepam, and others.   To make sure clonazepam is safe for you, tell your doctor if you have ever had:  · kidney or liver disease;  · glaucoma;  · asthma, emphysema, bronchitis, chronic obstructive pulmonary disorder (COPD), or other breathing problems;  · porphyria (a genetic enzyme disorder that causes symptoms affecting the skin or nervous system);  · depression or suicidal thoughts or behavior;  · mental illness, psychosis, or addiction to drugs or disintegrating tablet:  · Keep the tablet in its blister pack until you are ready to take it. Open the package and peel back the foil. Do not push a tablet through the foil or you may damage the tablet. · Use dry hands to remove the tablet and place it in your mouth. · Do not swallow the tablet whole. Allow it to dissolve in your mouth without chewing. · Swallow several times as the tablet dissolves. If desired, you may drink liquid to help swallow the dissolved tablet. If you use this medicine long-term, you may need frequent medical tests. Do not stop using clonazepam suddenly or you could have unpleasant withdrawal symptoms, including a seizure (convulsions). Ask your doctor how to safely stop using this medicine. Call your doctor if this medicine seems to stop working as well in treating your seizures or anxiety symptoms. Seizures are often treated with a combination of drugs. Use all medications as directed by your doctor. Read the medication guide or patient instructions provided with each medication. Do not change your doses or medication schedule without your doctor's advice. Store at room temperature away from moisture, heat, and light. Keep track of the amount of medicine used from each new bottle. Clonazepam is a drug of abuse and you should be aware if anyone is using your medicine improperly or without a prescription. What happens if I miss a dose? Take the missed dose as soon as you remember. Skip the missed dose if it is almost time for your next scheduled dose. Do not take extra medicine to make up the missed dose. What happens if I overdose? Seek emergency medical attention or call the Poison Help line at 1-354.515.9729. Overdose symptoms may include extreme drowsiness, confusion, muscle weakness, fainting, or coma. What should I avoid while taking clonazepam?  Avoid drinking alcohol. Dangerous side effects could occur. Clonazepam may impair your thinking or reactions.  Avoid driving or operating machinery until you know how this medicine will affect you. Dizziness or severe drowsiness can cause falls or other accidents. What are the possible side effects of clonazepam?  Get emergency medical help if you have signs of an allergic reaction: hives; difficulty breathing; swelling of your face, lips, tongue, or throat. Report any new or worsening symptoms to your doctor, such as: mood or behavior changes, anxiety, panic attacks, trouble sleeping, or if you feel impulsive, irritable, agitated, hostile, aggressive, restless, hyperactive (mentally or physically), more depressed, or have thoughts about suicide or hurting yourself. Call your doctor at once if you have:  · new or worsening seizures;  · severe drowsiness;  · unusual changes in mood or behavior;  · confusion, aggression, hallucinations;  · thoughts of suicide or hurting yourself;  · weak or shallow breathing;  · pounding heartbeats or fluttering in your chest; or  · unusual or involuntary eye movements. The sedative effects of clonazepam may last longer in older adults. Accidental falls are common in elderly patients who take benzodiazepines. Use caution to avoid falling or accidental injury while you are taking clonazepam.  Common side effects may include:  · feeling tired or depressed;  · drowsiness, dizziness;  · memory problems; or  · problems with balance or coordination. This is not a complete list of side effects and others may occur. Call your doctor for medical advice about side effects. You may report side effects to FDA at 5-152-FDA-4161. What other drugs will affect clonazepam?  Taking clonazepam with other drugs that make you sleepy or slow your breathing can cause dangerous side effects or death. Ask your doctor before taking a sleeping pill, an opioid medicine, prescription cough medicine, a muscle relaxer, or medicine for anxiety, depression, or seizures.   Other drugs may interact with clonazepam, including Inc[employer] Version: 8.01. Revision date: 11/6/2017. Care instructions adapted under license by [facility] ([facility]). If you have questions about a medical condition or this instruction, always ask your healthcare professional. [address] any warranty or liability for your use of this information.

## 2019-08-15 NOTE — PROGRESS NOTES
thick stool in right colon, 5-10 yr recall    UTERINE SUSPENSION         Social History     Tobacco Use    Smoking status: Former Smoker     Packs/day: 2.00     Years: 20.00     Pack years: 40.00     Types: Cigarettes     Last attempt to quit: 2019     Years since quittin.2    Smokeless tobacco: Never Used   Substance Use Topics    Alcohol use: Yes     Alcohol/week: 0.0 standard drinks       No family history on file. Review of Systems   Constitutional: Negative for chills, fatigue and fever. HENT: Negative for congestion, ear pain, rhinorrhea, sneezing, sore throat and voice change. Eyes: Negative for redness. Respiratory: Negative for cough and shortness of breath. Cardiovascular: Negative for chest pain. Gastrointestinal: Negative for constipation, diarrhea and vomiting. Musculoskeletal: Positive for arthralgias (right clavicle region). Skin: Negative for rash. Neurological: Negative for dizziness, numbness and headaches. Psychiatric/Behavioral: Positive for decreased concentration (ADHD managed by Dr. Naomi Carlson) and sleep disturbance (improved with Seroquel). The patient is nervous/anxious (stable, with Klonopin). Panic attacks--stable       Physical Exam   Constitutional: She appears well-developed. HENT:   Head: Normocephalic. Right Ear: Tympanic membrane and external ear normal.   Left Ear: Tympanic membrane and external ear normal.   Nose: Nose normal.   Mouth/Throat: Oropharynx is clear and moist.   Neck: Normal range of motion. Cardiovascular: Normal rate and regular rhythm. Pulmonary/Chest: Effort normal and breath sounds normal. No stridor. No respiratory distress. She has no wheezes. She has no rales. Abdominal: Soft. Bowel sounds are normal. She exhibits no distension. There is no tenderness. Musculoskeletal:        Arms:  Neurological: She is alert. Skin: Skin is dry. Psychiatric: She has a normal mood and affect.  Her behavior is have:  · narrow-angle glaucoma;  · severe liver disease; or  · a history of allergic reaction to any benzodiazepine, such as diazepam (Valium), alprazolam (Xanax), lorazepam (Ativan), chlordiazepoxide, flurazepam, and others. To make sure clonazepam is safe for you, tell your doctor if you have ever had:  · kidney or liver disease;  · glaucoma;  · asthma, emphysema, bronchitis, chronic obstructive pulmonary disorder (COPD), or other breathing problems;  · porphyria (a genetic enzyme disorder that causes symptoms affecting the skin or nervous system);  · depression or suicidal thoughts or behavior;  · mental illness, psychosis, or addiction to drugs or alcohol; or  · if you use a narcotic (opioid) medication. Some people have thoughts about suicide when taking seizure medication. Your doctor should check your progress at regular visits. Your family or other caregivers should also be alert to changes in your mood or symptoms. Follow your doctor's instructions about taking seizure medication if you are pregnant. Do not start or stop taking this medicine without your doctor's advice, and tell your doctor right away if you become pregnant. Clonazepam may cause harm to an unborn baby, and may cause breathing or feeding problems in a . But having seizures during pregnancy could harm both mother and baby. If you are pregnant, your name may be listed on a pregnancy registry. This is to track the outcome of the pregnancy and to evaluate any effects of clonazepam on the baby. Clonazepam can pass into breast milk, but effects on the nursing baby are not known. Tell your doctor if you are breast-feeding. Do not give this medicine to a child without medical advice. Clonazepam is not approved to treat panic disorder in anyone younger than 25years old. How should I take clonazepam?  Follow all directions on your prescription label. Your doctor may occasionally change your dose to make sure you get the best results. Do not use this medicine in larger or smaller amounts or for longer than recommended. Clonazepam may be habit-forming. Never share this medicine with another person, especially someone with a history of drug abuse or addiction. Keep the medication in a place where others cannot get to it. Misuse of habit-forming medicine can cause addiction, overdose, or death. Selling or giving away this medicine is against the law. Clonazepam should be used for only a short time. Do not take this medication for longer than 9 weeks without your doctor's advice. Swallow the regular clonazepam tablet whole, with a full glass of water. To take the orally disintegrating tablet:  · Keep the tablet in its blister pack until you are ready to take it. Open the package and peel back the foil. Do not push a tablet through the foil or you may damage the tablet. · Use dry hands to remove the tablet and place it in your mouth. · Do not swallow the tablet whole. Allow it to dissolve in your mouth without chewing. · Swallow several times as the tablet dissolves. If desired, you may drink liquid to help swallow the dissolved tablet. If you use this medicine long-term, you may need frequent medical tests. Do not stop using clonazepam suddenly or you could have unpleasant withdrawal symptoms, including a seizure (convulsions). Ask your doctor how to safely stop using this medicine. Call your doctor if this medicine seems to stop working as well in treating your seizures or anxiety symptoms. Seizures are often treated with a combination of drugs. Use all medications as directed by your doctor. Read the medication guide or patient instructions provided with each medication. Do not change your doses or medication schedule without your doctor's advice. Store at room temperature away from moisture, heat, and light. Keep track of the amount of medicine used from each new bottle.  Clonazepam is a drug of abuse and you should be aware if depressed;  · drowsiness, dizziness;  · memory problems; or  · problems with balance or coordination. This is not a complete list of side effects and others may occur. Call your doctor for medical advice about side effects. You may report side effects to FDA at 4-610-RZO-3926. What other drugs will affect clonazepam?  Taking clonazepam with other drugs that make you sleepy or slow your breathing can cause dangerous side effects or death. Ask your doctor before taking a sleeping pill, an opioid medicine, prescription cough medicine, a muscle relaxer, or medicine for anxiety, depression, or seizures. Other drugs may interact with clonazepam, including prescription and over-the-counter medicines, vitamins, and herbal products. Tell your doctor about all your current medicines and any medicine you start or stop using. Where can I get more information? Your pharmacist can provide more information about clonazepam.  Remember, keep this and all other medicines out of the reach of children, never share your medicines with others, and use this medication only for the indication prescribed. Every effort has been made to ensure that the information provided by [address] is accurate, up-to-date, and complete, but no guarantee is made to that effect. Drug information contained herein may be time sensitive. [facility] information has been compiled for use by healthcare practitioners and consumers in the [facility] and therefore [facility] does not warrant that uses outside of the [facility] are appropriate, unless specifically indicated otherwise. [facility]'s drug information does not endorse drugs, diagnose patients or recommend therapy.  [facility] drug information is an informational resource designed to assist licensed healthcare practitioners in caring for their patients and/or to serve consumers viewing this service as a supplement to, and not a substitute for, the expertise, skill, knowledge and judgment

## 2019-08-16 NOTE — TELEPHONE ENCOUNTER
----- Message from RYAN Lin sent at 8/15/2019  4:10 PM CDT -----  Clavicle x-ray, no acute bony pathology noted

## 2019-09-13 ENCOUNTER — OFFICE VISIT (OUTPATIENT)
Dept: PRIMARY CARE CLINIC | Age: 39
End: 2019-09-13
Payer: COMMERCIAL

## 2019-09-13 VITALS
HEART RATE: 111 BPM | DIASTOLIC BLOOD PRESSURE: 86 MMHG | HEIGHT: 66 IN | TEMPERATURE: 97.7 F | OXYGEN SATURATION: 97 % | BODY MASS INDEX: 35.03 KG/M2 | SYSTOLIC BLOOD PRESSURE: 135 MMHG | WEIGHT: 218 LBS

## 2019-09-13 DIAGNOSIS — F41.9 ANXIETY: ICD-10-CM

## 2019-09-13 DIAGNOSIS — F99 INSOMNIA DUE TO OTHER MENTAL DISORDER: Primary | ICD-10-CM

## 2019-09-13 DIAGNOSIS — F41.0 PANIC ATTACK: ICD-10-CM

## 2019-09-13 DIAGNOSIS — F51.05 INSOMNIA DUE TO OTHER MENTAL DISORDER: Primary | ICD-10-CM

## 2019-09-13 PROCEDURE — G8417 CALC BMI ABV UP PARAM F/U: HCPCS | Performed by: NURSE PRACTITIONER

## 2019-09-13 PROCEDURE — 99213 OFFICE O/P EST LOW 20 MIN: CPT | Performed by: NURSE PRACTITIONER

## 2019-09-13 PROCEDURE — G8427 DOCREV CUR MEDS BY ELIG CLIN: HCPCS | Performed by: NURSE PRACTITIONER

## 2019-09-13 PROCEDURE — 1036F TOBACCO NON-USER: CPT | Performed by: NURSE PRACTITIONER

## 2019-09-13 RX ORDER — CLONAZEPAM 2 MG/1
2 TABLET ORAL 2 TIMES DAILY PRN
Qty: 60 TABLET | Refills: 0 | Status: SHIPPED | OUTPATIENT
Start: 2019-09-13 | End: 2019-11-21 | Stop reason: SDUPTHER

## 2019-09-13 RX ORDER — CYCLOBENZAPRINE HCL 10 MG
10 TABLET ORAL 2 TIMES DAILY PRN
Qty: 60 TABLET | Refills: 1 | Status: SHIPPED | OUTPATIENT
Start: 2019-09-13 | End: 2019-09-23

## 2019-09-13 ASSESSMENT — ENCOUNTER SYMPTOMS
VOMITING: 0
RHINORRHEA: 0
CONSTIPATION: 0
DIARRHEA: 0
VOICE CHANGE: 0
SHORTNESS OF BREATH: 0
EYE REDNESS: 0
SORE THROAT: 0
COUGH: 0

## 2019-09-13 NOTE — PROGRESS NOTES
Soft. Bowel sounds are normal. She exhibits no distension. There is no tenderness. Neurological: She is alert. Skin: Skin is dry. Psychiatric: She has a normal mood and affect. Her behavior is normal. Judgment and thought content normal.   Vitals reviewed. ASSESSMENT      ICD-10-CM    1. Insomnia due to other mental disorder F51.05 clonazePAM (KLONOPIN) 2 MG tablet    F99 cyclobenzaprine (FLEXERIL) 10 MG tablet  Hold Seroquel   2. Anxiety F41.9 clonazePAM (KLONOPIN) 2 MG tablet   3. Panic attack F41.0 clonazePAM (KLONOPIN) 2 MG tablet         PLAN    No orders of the defined types were placed in this encounter. Return in about 1 month (around 10/13/2019), or if symptoms worsen or fail to improve. Patient Instructions       Patient Education        Anxiety Disorder: Care Instructions  Your Care Instructions    Anxiety is a normal reaction to stress. Difficult situations can cause you to have symptoms such as sweaty palms and a nervous feeling. In an anxiety disorder, the symptoms are far more severe. Constant worry, muscle tension, trouble sleeping, nausea and diarrhea, and other symptoms can make normal daily activities difficult or impossible. These symptoms may occur for no reason, and they can affect your work, school, or social life. Medicines, counseling, and self-care can all help. Follow-up care is a key part of your treatment and safety. Be sure to make and go to all appointments, and call your doctor if you are having problems. It's also a good idea to know your test results and keep a list of the medicines you take. How can you care for yourself at home? · Take medicines exactly as directed. Call your doctor if you think you are having a problem with your medicine. · Go to your counseling sessions and follow-up appointments. · Recognize and accept your anxiety. Then, when you are in a situation that makes you anxious, say to yourself, \"This is not an emergency.  I feel uncomfortable, but I am not in danger. I can keep going even if I feel anxious. \"  · Be kind to your body:  ? Relieve tension with exercise or a massage. ? Get enough rest.  ? Avoid alcohol, caffeine, nicotine, and illegal drugs. They can increase your anxiety level and cause sleep problems. ? Learn and do relaxation techniques. See below for more about these techniques. · Engage your mind. Get out and do something you enjoy. Go to a funny movie, or take a walk or hike. Plan your day. Having too much or too little to do can make you anxious. · Keep a record of your symptoms. Discuss your fears with a good friend or family member, or join a support group for people with similar problems. Talking to others sometimes relieves stress. · Get involved in social groups, or volunteer to help others. Being alone sometimes makes things seem worse than they are. · Get at least 30 minutes of exercise on most days of the week to relieve stress. Walking is a good choice. You also may want to do other activities, such as running, swimming, cycling, or playing tennis or team sports. Relaxation techniques  Do relaxation exercises 10 to 20 minutes a day. You can play soothing, relaxing music while you do them, if you wish. · Tell others in your house that you are going to do your relaxation exercises. Ask them not to disturb you. · Find a comfortable place, away from all distractions and noise. · Lie down on your back, or sit with your back straight. · Focus on your breathing. Make it slow and steady. · Breathe in through your nose. Breathe out through either your nose or mouth. · Breathe deeply, filling up the area between your navel and your rib cage. Breathe so that your belly goes up and down. · Do not hold your breath. · Breathe like this for 5 to 10 minutes. Notice the feeling of calmness throughout your whole body.   As you continue to breathe slowly and deeply, relax by doing the following for another 5 to 10

## 2019-10-03 DIAGNOSIS — F99 INSOMNIA DUE TO OTHER MENTAL DISORDER: ICD-10-CM

## 2019-10-03 DIAGNOSIS — F51.05 INSOMNIA DUE TO OTHER MENTAL DISORDER: ICD-10-CM

## 2019-10-03 RX ORDER — QUETIAPINE FUMARATE 100 MG/1
TABLET, FILM COATED ORAL
Qty: 30 TABLET | Refills: 5 | OUTPATIENT
Start: 2019-10-03

## 2019-10-08 DIAGNOSIS — G62.9 NEUROPATHY: ICD-10-CM

## 2019-10-08 DIAGNOSIS — M54.12 CERVICAL RADICULOPATHY: ICD-10-CM

## 2019-10-09 RX ORDER — GABAPENTIN 600 MG/1
TABLET ORAL
Qty: 60 TABLET | Refills: 0 | Status: SHIPPED | OUTPATIENT
Start: 2019-10-09 | End: 2019-11-11 | Stop reason: SDUPTHER

## 2019-11-11 DIAGNOSIS — G62.9 NEUROPATHY: ICD-10-CM

## 2019-11-11 DIAGNOSIS — M54.12 CERVICAL RADICULOPATHY: ICD-10-CM

## 2019-11-11 RX ORDER — GABAPENTIN 600 MG/1
TABLET ORAL
Qty: 60 TABLET | Refills: 1 | Status: SHIPPED | OUTPATIENT
Start: 2019-11-11 | End: 2019-12-18 | Stop reason: SDUPTHER

## 2019-11-21 ENCOUNTER — TELEPHONE (OUTPATIENT)
Dept: PRIMARY CARE CLINIC | Age: 39
End: 2019-11-21

## 2019-11-21 ENCOUNTER — HOSPITAL ENCOUNTER (OUTPATIENT)
Dept: GENERAL RADIOLOGY | Age: 39
Discharge: HOME | End: 2019-11-21
Payer: COMMERCIAL

## 2019-11-21 ENCOUNTER — OFFICE VISIT (OUTPATIENT)
Dept: PRIMARY CARE CLINIC | Age: 39
End: 2019-11-21
Payer: COMMERCIAL

## 2019-11-21 VITALS
WEIGHT: 226 LBS | SYSTOLIC BLOOD PRESSURE: 164 MMHG | HEIGHT: 66 IN | BODY MASS INDEX: 36.32 KG/M2 | TEMPERATURE: 97.8 F | HEART RATE: 96 BPM | OXYGEN SATURATION: 97 % | DIASTOLIC BLOOD PRESSURE: 90 MMHG

## 2019-11-21 DIAGNOSIS — G89.29 CHRONIC BILATERAL LOW BACK PAIN WITHOUT SCIATICA: ICD-10-CM

## 2019-11-21 DIAGNOSIS — F99 INSOMNIA DUE TO OTHER MENTAL DISORDER: ICD-10-CM

## 2019-11-21 DIAGNOSIS — M54.50 CHRONIC BILATERAL LOW BACK PAIN WITHOUT SCIATICA: ICD-10-CM

## 2019-11-21 DIAGNOSIS — F41.0 PANIC ATTACK: ICD-10-CM

## 2019-11-21 DIAGNOSIS — F41.9 ANXIETY: Primary | ICD-10-CM

## 2019-11-21 DIAGNOSIS — E66.09 CLASS 2 OBESITY DUE TO EXCESS CALORIES WITHOUT SERIOUS COMORBIDITY WITH BODY MASS INDEX (BMI) OF 36.0 TO 36.9 IN ADULT: ICD-10-CM

## 2019-11-21 DIAGNOSIS — I10 ESSENTIAL HYPERTENSION: ICD-10-CM

## 2019-11-21 DIAGNOSIS — F51.05 INSOMNIA DUE TO OTHER MENTAL DISORDER: ICD-10-CM

## 2019-11-21 PROCEDURE — G8484 FLU IMMUNIZE NO ADMIN: HCPCS | Performed by: NURSE PRACTITIONER

## 2019-11-21 PROCEDURE — G8427 DOCREV CUR MEDS BY ELIG CLIN: HCPCS | Performed by: NURSE PRACTITIONER

## 2019-11-21 PROCEDURE — 72100 X-RAY EXAM L-S SPINE 2/3 VWS: CPT

## 2019-11-21 PROCEDURE — 99214 OFFICE O/P EST MOD 30 MIN: CPT | Performed by: NURSE PRACTITIONER

## 2019-11-21 PROCEDURE — G8417 CALC BMI ABV UP PARAM F/U: HCPCS | Performed by: NURSE PRACTITIONER

## 2019-11-21 PROCEDURE — 1036F TOBACCO NON-USER: CPT | Performed by: NURSE PRACTITIONER

## 2019-11-21 RX ORDER — CLONAZEPAM 2 MG/1
2 TABLET ORAL 2 TIMES DAILY PRN
Qty: 60 TABLET | Refills: 0 | Status: SHIPPED | OUTPATIENT
Start: 2019-11-21 | End: 2019-12-21

## 2019-11-21 RX ORDER — METOPROLOL SUCCINATE 50 MG/1
50 TABLET, EXTENDED RELEASE ORAL DAILY
Qty: 30 TABLET | Refills: 5 | Status: SHIPPED | OUTPATIENT
Start: 2019-11-21 | End: 2019-12-18 | Stop reason: SDUPTHER

## 2019-11-21 ASSESSMENT — ENCOUNTER SYMPTOMS
VOMITING: 0
DIARRHEA: 0
SHORTNESS OF BREATH: 0
CONSTIPATION: 0
COUGH: 0
SORE THROAT: 0
EYE REDNESS: 0
BACK PAIN: 1
VOICE CHANGE: 0
RHINORRHEA: 0

## 2019-12-12 DIAGNOSIS — F41.9 ANXIETY: ICD-10-CM

## 2019-12-12 RX ORDER — DULOXETIN HYDROCHLORIDE 60 MG/1
60 CAPSULE, DELAYED RELEASE ORAL DAILY
Qty: 90 CAPSULE | Refills: 3 | Status: SHIPPED | OUTPATIENT
Start: 2019-12-12

## 2019-12-17 DIAGNOSIS — I10 ESSENTIAL HYPERTENSION: ICD-10-CM

## 2019-12-17 DIAGNOSIS — M54.12 CERVICAL RADICULOPATHY: ICD-10-CM

## 2019-12-17 DIAGNOSIS — G62.9 NEUROPATHY: ICD-10-CM

## 2019-12-17 DIAGNOSIS — E78.2 MIXED HYPERLIPIDEMIA: ICD-10-CM

## 2019-12-18 RX ORDER — SIMVASTATIN 40 MG
40 TABLET ORAL EVERY EVENING
Qty: 30 TABLET | Refills: 5 | Status: SHIPPED | OUTPATIENT
Start: 2019-12-18 | End: 2020-08-13

## 2019-12-18 RX ORDER — GABAPENTIN 600 MG/1
TABLET ORAL
Qty: 60 TABLET | Refills: 1 | Status: SHIPPED | OUTPATIENT
Start: 2019-12-18 | End: 2020-02-16

## 2019-12-18 RX ORDER — METOPROLOL SUCCINATE 50 MG/1
50 TABLET, EXTENDED RELEASE ORAL DAILY
Qty: 30 TABLET | Refills: 5 | Status: SHIPPED | OUTPATIENT
Start: 2019-12-18 | End: 2020-12-21

## 2020-12-17 RX ORDER — SIMVASTATIN 40 MG
40 TABLET ORAL NIGHTLY
Qty: 30 TABLET | Refills: 0 | Status: SHIPPED | OUTPATIENT
Start: 2020-12-17

## 2020-12-17 NOTE — TELEPHONE ENCOUNTER
Received fax from pharmacy requesting refill on pts medication(s). Pt was last seen in office on 11/21/2019  and has a follow up scheduled for Visit date not found. Will send request to  [facility]  for patient.      Requested Prescriptions     Pending Prescriptions Disp Refills    simvastatin (ZOCOR) 40 MG tablet [Pharmacy Med Name: SIMVASTATIN 40 MG TABLET] 30 tablet 3     Sig: TAKE 1 TABLET BY MOUTH EVERY DAY AT NIGHT

## 2020-12-21 RX ORDER — METOPROLOL SUCCINATE 50 MG/1
50 TABLET, EXTENDED RELEASE ORAL DAILY
Qty: 30 TABLET | Refills: 5 | Status: SHIPPED | OUTPATIENT
Start: 2020-12-21

## 2020-12-21 NOTE — TELEPHONE ENCOUNTER
Received fax from pharmacy requesting refill on pts medication(s). Pt was last seen in office on 11/21/2019  and has a follow up scheduled for Visit date not found. Will send request to  [facility]  for patient.      Requested Prescriptions     Pending Prescriptions Disp Refills    metoprolol succinate (TOPROL XL) 50 MG extended release tablet [Pharmacy Med Name: METOPROLOL SUCC ER 50 MG TAB] 30 tablet 5     Sig: TAKE 1 TABLET BY MOUTH EVERY DAY

## 2021-02-16 DIAGNOSIS — E78.2 MIXED HYPERLIPIDEMIA: ICD-10-CM

## 2021-02-16 RX ORDER — SIMVASTATIN 40 MG
TABLET ORAL
Qty: 30 TABLET | Refills: 0 | OUTPATIENT
Start: 2021-02-16